# Patient Record
Sex: FEMALE | Race: WHITE | NOT HISPANIC OR LATINO
[De-identification: names, ages, dates, MRNs, and addresses within clinical notes are randomized per-mention and may not be internally consistent; named-entity substitution may affect disease eponyms.]

---

## 2017-07-06 ENCOUNTER — APPOINTMENT (OUTPATIENT)
Dept: OPHTHALMOLOGY | Facility: CLINIC | Age: 72
End: 2017-07-06

## 2017-07-06 DIAGNOSIS — B30.0 KERATOCONJUNCTIVITIS DUE TO ADENOVIRUS: ICD-10-CM

## 2017-08-04 NOTE — ASU PATIENT PROFILE, ADULT - MEDICATIONS TO HOLD
Do not take any diuretics,hypoglycemics, aspirin,anti-inflammatory products and vitamins prior to surgery

## 2017-08-04 NOTE — ASU PATIENT PROFILE, ADULT - MEDICATIONS TO TAKE
May Take all cardiac, blood pressure and parkinsons medications if applicable-with a sip of water only on the morning of surgery.

## 2017-08-04 NOTE — ASU PATIENT PROFILE, ADULT - PSH
Complete rotator cuff tear, left  surgery for, hardware placed  H/O: hammer toe correction  left foot,hardware placed  History of cholecystectomy    History of tonsillectomy    S/P tubal ligation

## 2017-08-07 ENCOUNTER — OUTPATIENT (OUTPATIENT)
Dept: OUTPATIENT SERVICES | Facility: HOSPITAL | Age: 72
LOS: 1 days | End: 2017-08-07
Payer: MEDICARE

## 2017-08-07 ENCOUNTER — TRANSCRIPTION ENCOUNTER (OUTPATIENT)
Age: 72
End: 2017-08-07

## 2017-08-07 VITALS
OXYGEN SATURATION: 100 % | HEART RATE: 60 BPM | DIASTOLIC BLOOD PRESSURE: 55 MMHG | SYSTOLIC BLOOD PRESSURE: 100 MMHG | RESPIRATION RATE: 16 BRPM

## 2017-08-07 VITALS
RESPIRATION RATE: 5 BRPM | WEIGHT: 141.1 LBS | HEART RATE: 57 BPM | TEMPERATURE: 98 F | OXYGEN SATURATION: 100 % | HEIGHT: 61 IN | SYSTOLIC BLOOD PRESSURE: 112 MMHG | DIASTOLIC BLOOD PRESSURE: 61 MMHG

## 2017-08-07 DIAGNOSIS — Z90.49 ACQUIRED ABSENCE OF OTHER SPECIFIED PARTS OF DIGESTIVE TRACT: Chronic | ICD-10-CM

## 2017-08-07 DIAGNOSIS — Z90.89 ACQUIRED ABSENCE OF OTHER ORGANS: Chronic | ICD-10-CM

## 2017-08-07 DIAGNOSIS — Z98.51 TUBAL LIGATION STATUS: Chronic | ICD-10-CM

## 2017-08-07 DIAGNOSIS — Z98.89 OTHER SPECIFIED POSTPROCEDURAL STATES: Chronic | ICD-10-CM

## 2017-08-07 DIAGNOSIS — M75.122 COMPLETE ROTATOR CUFF TEAR OR RUPTURE OF LEFT SHOULDER, NOT SPECIFIED AS TRAUMATIC: Chronic | ICD-10-CM

## 2017-08-07 DIAGNOSIS — H25.811 COMBINED FORMS OF AGE-RELATED CATARACT, RIGHT EYE: ICD-10-CM

## 2017-08-07 PROCEDURE — C1780: CPT

## 2017-08-07 PROCEDURE — 66984 XCAPSL CTRC RMVL W/O ECP: CPT | Mod: RT

## 2017-08-21 ENCOUNTER — OUTPATIENT (OUTPATIENT)
Dept: OUTPATIENT SERVICES | Facility: HOSPITAL | Age: 72
LOS: 1 days | End: 2017-08-21
Payer: MEDICARE

## 2017-08-21 ENCOUNTER — TRANSCRIPTION ENCOUNTER (OUTPATIENT)
Age: 72
End: 2017-08-21

## 2017-08-21 VITALS
TEMPERATURE: 98 F | HEART RATE: 56 BPM | SYSTOLIC BLOOD PRESSURE: 116 MMHG | HEIGHT: 61 IN | WEIGHT: 142.86 LBS | DIASTOLIC BLOOD PRESSURE: 61 MMHG | OXYGEN SATURATION: 100 % | RESPIRATION RATE: 20 BRPM

## 2017-08-21 VITALS
SYSTOLIC BLOOD PRESSURE: 102 MMHG | HEART RATE: 54 BPM | RESPIRATION RATE: 18 BRPM | DIASTOLIC BLOOD PRESSURE: 53 MMHG | OXYGEN SATURATION: 99 %

## 2017-08-21 DIAGNOSIS — Z90.89 ACQUIRED ABSENCE OF OTHER ORGANS: Chronic | ICD-10-CM

## 2017-08-21 DIAGNOSIS — Z98.49 CATARACT EXTRACTION STATUS, UNSPECIFIED EYE: Chronic | ICD-10-CM

## 2017-08-21 DIAGNOSIS — Z90.49 ACQUIRED ABSENCE OF OTHER SPECIFIED PARTS OF DIGESTIVE TRACT: Chronic | ICD-10-CM

## 2017-08-21 DIAGNOSIS — Z98.51 TUBAL LIGATION STATUS: Chronic | ICD-10-CM

## 2017-08-21 DIAGNOSIS — H25.812 COMBINED FORMS OF AGE-RELATED CATARACT, LEFT EYE: ICD-10-CM

## 2017-08-21 DIAGNOSIS — Z98.89 OTHER SPECIFIED POSTPROCEDURAL STATES: Chronic | ICD-10-CM

## 2017-08-21 DIAGNOSIS — M75.122 COMPLETE ROTATOR CUFF TEAR OR RUPTURE OF LEFT SHOULDER, NOT SPECIFIED AS TRAUMATIC: Chronic | ICD-10-CM

## 2017-08-21 PROCEDURE — C1780: CPT

## 2017-08-21 PROCEDURE — 66984 XCAPSL CTRC RMVL W/O ECP: CPT | Mod: LT

## 2017-08-21 NOTE — ASU PATIENT PROFILE, ADULT - PSH
Complete rotator cuff tear, left  surgery for, hardware placed  H/O: hammer toe correction  left foot,hardware placed  History of cholecystectomy    History of tonsillectomy    S/P tubal ligation    Status post cataract extraction  right eye

## 2018-01-22 ENCOUNTER — NON-APPOINTMENT (OUTPATIENT)
Age: 73
End: 2018-01-22

## 2018-01-22 ENCOUNTER — APPOINTMENT (OUTPATIENT)
Dept: CARDIOLOGY | Facility: CLINIC | Age: 73
End: 2018-01-22
Payer: MEDICARE

## 2018-01-22 VITALS
WEIGHT: 140 LBS | HEART RATE: 58 BPM | OXYGEN SATURATION: 100 % | DIASTOLIC BLOOD PRESSURE: 67 MMHG | HEIGHT: 62 IN | BODY MASS INDEX: 25.76 KG/M2 | SYSTOLIC BLOOD PRESSURE: 109 MMHG

## 2018-01-22 PROCEDURE — 93000 ELECTROCARDIOGRAM COMPLETE: CPT

## 2018-01-22 PROCEDURE — 99215 OFFICE O/P EST HI 40 MIN: CPT

## 2018-01-22 RX ORDER — GATIFLOXACIN 5 MG/ML
0.5 SOLUTION/ DROPS OPHTHALMIC
Qty: 25 | Refills: 0 | Status: DISCONTINUED | COMMUNITY
Start: 2017-06-16 | End: 2018-01-22

## 2018-01-22 RX ORDER — TOBRAMYCIN AND DEXAMETHASONE 3; 1 MG/ML; MG/ML
0.3-0.1 SUSPENSION/ DROPS OPHTHALMIC 4 TIMES DAILY
Qty: 1 | Refills: 1 | Status: DISCONTINUED | COMMUNITY
Start: 2017-07-06 | End: 2018-01-22

## 2018-01-22 RX ORDER — TOBRAMYCIN AND DEXAMETHASONE 3; 1 MG/ML; MG/ML
0.3-0.1 SUSPENSION/ DROPS OPHTHALMIC
Qty: 5 | Refills: 0 | Status: DISCONTINUED | COMMUNITY
Start: 2017-06-29 | End: 2018-01-22

## 2018-01-22 RX ORDER — PREDNISOLONE ACETATE 10 MG/ML
1 SUSPENSION/ DROPS OPHTHALMIC
Qty: 10 | Refills: 0 | Status: DISCONTINUED | COMMUNITY
Start: 2017-06-16 | End: 2018-01-22

## 2018-01-22 RX ORDER — OFLOXACIN 3 MG/ML
0.3 SOLUTION/ DROPS OPHTHALMIC
Qty: 5 | Refills: 0 | Status: DISCONTINUED | COMMUNITY
Start: 2017-07-03 | End: 2018-01-22

## 2018-01-22 RX ORDER — KETOROLAC TROMETHAMINE 4 MG/ML
0.4 SOLUTION/ DROPS OPHTHALMIC
Qty: 5 | Refills: 0 | Status: DISCONTINUED | COMMUNITY
Start: 2017-06-16 | End: 2018-01-22

## 2018-01-24 ENCOUNTER — APPOINTMENT (OUTPATIENT)
Dept: ORTHOPEDIC SURGERY | Facility: CLINIC | Age: 73
End: 2018-01-24
Payer: MEDICARE

## 2018-01-24 DIAGNOSIS — Z87.39 PERSONAL HISTORY OF OTHER DISEASES OF THE MUSCULOSKELETAL SYSTEM AND CONNECTIVE TISSUE: ICD-10-CM

## 2018-01-24 PROCEDURE — 99203 OFFICE O/P NEW LOW 30 MIN: CPT

## 2018-01-24 PROCEDURE — 73564 X-RAY EXAM KNEE 4 OR MORE: CPT | Mod: LT

## 2018-01-25 LAB
ANION GAP SERPL CALC-SCNC: 15 MMOL/L
BUN SERPL-MCNC: 21 MG/DL
CALCIUM SERPL-MCNC: 9.6 MG/DL
CHLORIDE SERPL-SCNC: 103 MMOL/L
CO2 SERPL-SCNC: 24 MMOL/L
CREAT SERPL-MCNC: 0.72 MG/DL
GLUCOSE SERPL-MCNC: 80 MG/DL
POTASSIUM SERPL-SCNC: 4.5 MMOL/L
SODIUM SERPL-SCNC: 142 MMOL/L

## 2018-01-26 PROBLEM — Z87.39 HISTORY OF OSTEOARTHRITIS: Status: RESOLVED | Noted: 2017-07-06 | Resolved: 2018-01-26

## 2018-01-30 ENCOUNTER — FORM ENCOUNTER (OUTPATIENT)
Age: 73
End: 2018-01-30

## 2018-01-31 ENCOUNTER — OUTPATIENT (OUTPATIENT)
Dept: OUTPATIENT SERVICES | Facility: HOSPITAL | Age: 73
LOS: 1 days | End: 2018-01-31
Payer: MEDICARE

## 2018-01-31 ENCOUNTER — APPOINTMENT (OUTPATIENT)
Dept: CARDIOLOGY | Facility: CLINIC | Age: 73
End: 2018-01-31

## 2018-01-31 DIAGNOSIS — R07.9 CHEST PAIN, UNSPECIFIED: ICD-10-CM

## 2018-01-31 DIAGNOSIS — I71.2 THORACIC AORTIC ANEURYSM, WITHOUT RUPTURE: ICD-10-CM

## 2018-01-31 DIAGNOSIS — Z90.89 ACQUIRED ABSENCE OF OTHER ORGANS: Chronic | ICD-10-CM

## 2018-01-31 DIAGNOSIS — Z98.49 CATARACT EXTRACTION STATUS, UNSPECIFIED EYE: Chronic | ICD-10-CM

## 2018-01-31 DIAGNOSIS — Z90.49 ACQUIRED ABSENCE OF OTHER SPECIFIED PARTS OF DIGESTIVE TRACT: Chronic | ICD-10-CM

## 2018-01-31 DIAGNOSIS — M75.122 COMPLETE ROTATOR CUFF TEAR OR RUPTURE OF LEFT SHOULDER, NOT SPECIFIED AS TRAUMATIC: Chronic | ICD-10-CM

## 2018-01-31 DIAGNOSIS — Z98.51 TUBAL LIGATION STATUS: Chronic | ICD-10-CM

## 2018-01-31 DIAGNOSIS — Z98.89 OTHER SPECIFIED POSTPROCEDURAL STATES: Chronic | ICD-10-CM

## 2018-01-31 PROCEDURE — 71275 CT ANGIOGRAPHY CHEST: CPT | Mod: 26

## 2018-01-31 PROCEDURE — 71275 CT ANGIOGRAPHY CHEST: CPT

## 2018-02-01 ENCOUNTER — OTHER (OUTPATIENT)
Age: 73
End: 2018-02-01

## 2018-02-06 ENCOUNTER — OUTPATIENT (OUTPATIENT)
Dept: OUTPATIENT SERVICES | Facility: HOSPITAL | Age: 73
LOS: 1 days | End: 2018-02-06
Payer: MEDICARE

## 2018-02-06 VITALS
TEMPERATURE: 98 F | WEIGHT: 141.1 LBS | RESPIRATION RATE: 14 BRPM | DIASTOLIC BLOOD PRESSURE: 60 MMHG | SYSTOLIC BLOOD PRESSURE: 107 MMHG | HEIGHT: 61 IN | HEART RATE: 60 BPM

## 2018-02-06 DIAGNOSIS — Z98.49 CATARACT EXTRACTION STATUS, UNSPECIFIED EYE: Chronic | ICD-10-CM

## 2018-02-06 DIAGNOSIS — Z98.89 OTHER SPECIFIED POSTPROCEDURAL STATES: Chronic | ICD-10-CM

## 2018-02-06 DIAGNOSIS — M19.90 UNSPECIFIED OSTEOARTHRITIS, UNSPECIFIED SITE: ICD-10-CM

## 2018-02-06 DIAGNOSIS — Z98.51 TUBAL LIGATION STATUS: Chronic | ICD-10-CM

## 2018-02-06 DIAGNOSIS — M19.079 PRIMARY OSTEOARTHRITIS, UNSPECIFIED ANKLE AND FOOT: Chronic | ICD-10-CM

## 2018-02-06 DIAGNOSIS — Z90.49 ACQUIRED ABSENCE OF OTHER SPECIFIED PARTS OF DIGESTIVE TRACT: Chronic | ICD-10-CM

## 2018-02-06 DIAGNOSIS — Z90.89 ACQUIRED ABSENCE OF OTHER ORGANS: Chronic | ICD-10-CM

## 2018-02-06 DIAGNOSIS — Z01.818 ENCOUNTER FOR OTHER PREPROCEDURAL EXAMINATION: ICD-10-CM

## 2018-02-06 DIAGNOSIS — M17.11 UNILATERAL PRIMARY OSTEOARTHRITIS, RIGHT KNEE: ICD-10-CM

## 2018-02-06 DIAGNOSIS — M75.122 COMPLETE ROTATOR CUFF TEAR OR RUPTURE OF LEFT SHOULDER, NOT SPECIFIED AS TRAUMATIC: Chronic | ICD-10-CM

## 2018-02-06 LAB
ALBUMIN SERPL ELPH-MCNC: 3.7 G/DL — SIGNIFICANT CHANGE UP (ref 3.3–5)
ALP SERPL-CCNC: 106 U/L — SIGNIFICANT CHANGE UP (ref 30–120)
ALT FLD-CCNC: 39 U/L DA — SIGNIFICANT CHANGE UP (ref 10–60)
ANION GAP SERPL CALC-SCNC: 11 MMOL/L — SIGNIFICANT CHANGE UP (ref 5–17)
APTT BLD: 37.5 SEC — HIGH (ref 27.5–37.4)
AST SERPL-CCNC: 25 U/L — SIGNIFICANT CHANGE UP (ref 10–40)
BILIRUB SERPL-MCNC: 0.3 MG/DL — SIGNIFICANT CHANGE UP (ref 0.2–1.2)
BLD GP AB SCN SERPL QL: SIGNIFICANT CHANGE UP
BUN SERPL-MCNC: 23 MG/DL — SIGNIFICANT CHANGE UP (ref 7–23)
CALCIUM SERPL-MCNC: 9.1 MG/DL — SIGNIFICANT CHANGE UP (ref 8.4–10.5)
CHLORIDE SERPL-SCNC: 105 MMOL/L — SIGNIFICANT CHANGE UP (ref 96–108)
CO2 SERPL-SCNC: 26 MMOL/L — SIGNIFICANT CHANGE UP (ref 22–31)
CREAT SERPL-MCNC: 0.65 MG/DL — SIGNIFICANT CHANGE UP (ref 0.5–1.3)
GLUCOSE SERPL-MCNC: 97 MG/DL — SIGNIFICANT CHANGE UP (ref 70–99)
HCT VFR BLD CALC: 38.2 % — SIGNIFICANT CHANGE UP (ref 34.5–45)
HGB BLD-MCNC: 12.9 G/DL — SIGNIFICANT CHANGE UP (ref 11.5–15.5)
INR BLD: 1.01 RATIO — SIGNIFICANT CHANGE UP (ref 0.88–1.16)
MCHC RBC-ENTMCNC: 30.7 PG — SIGNIFICANT CHANGE UP (ref 27–34)
MCHC RBC-ENTMCNC: 33.7 GM/DL — SIGNIFICANT CHANGE UP (ref 32–36)
MCV RBC AUTO: 91.2 FL — SIGNIFICANT CHANGE UP (ref 80–100)
MRSA PCR RESULT.: SIGNIFICANT CHANGE UP
PLATELET # BLD AUTO: 239 K/UL — SIGNIFICANT CHANGE UP (ref 150–400)
POTASSIUM SERPL-MCNC: 4.2 MMOL/L — SIGNIFICANT CHANGE UP (ref 3.5–5.3)
POTASSIUM SERPL-SCNC: 4.2 MMOL/L — SIGNIFICANT CHANGE UP (ref 3.5–5.3)
PROT SERPL-MCNC: 7 G/DL — SIGNIFICANT CHANGE UP (ref 6–8.3)
PROTHROM AB SERPL-ACNC: 11 SEC — SIGNIFICANT CHANGE UP (ref 9.8–12.7)
RBC # BLD: 4.19 M/UL — SIGNIFICANT CHANGE UP (ref 3.8–5.2)
RBC # FLD: 12 % — SIGNIFICANT CHANGE UP (ref 10.3–14.5)
S AUREUS DNA NOSE QL NAA+PROBE: SIGNIFICANT CHANGE UP
SODIUM SERPL-SCNC: 142 MMOL/L — SIGNIFICANT CHANGE UP (ref 135–145)
WBC # BLD: 4.7 K/UL — SIGNIFICANT CHANGE UP (ref 3.8–10.5)
WBC # FLD AUTO: 4.7 K/UL — SIGNIFICANT CHANGE UP (ref 3.8–10.5)

## 2018-02-06 PROCEDURE — 93010 ELECTROCARDIOGRAM REPORT: CPT | Mod: NC

## 2018-02-06 PROCEDURE — G0463: CPT

## 2018-02-06 PROCEDURE — 93005 ELECTROCARDIOGRAM TRACING: CPT

## 2018-02-06 NOTE — H&P PST ADULT - CARDIOVASCULAR COMMENTS
Thoracic aneurysm being monitored by cardiologist , CT chest done last month; showed 4 cm . cardiac eval done last year , cleared by cardiologist

## 2018-02-06 NOTE — H&P PST ADULT - PSH
Complete rotator cuff tear, left  surgery for, hardware placed  H/O: hammer toe correction  left foot,hardware placed  History of cholecystectomy    History of tonsillectomy    S/P tubal ligation    Status post cataract extraction  right eye Complete rotator cuff tear, left  surgery for, hardware placed  H/O: hammer toe correction  left foot,hardware placed  History of cholecystectomy    History of tonsillectomy    S/P tubal ligation    Status post cataract extraction  bilateral Complete rotator cuff tear, left  surgery for, hardware placed  H/O: hammer toe correction  left foot, hardware placed  History of cholecystectomy    History of tonsillectomy    S/P tubal ligation    Status post cataract extraction  bilateral Arthritis of big toe  left big toe fusion 2012  Complete rotator cuff tear, left  surgery for, hardware placed  H/O: hammer toe correction  left foot,hardware placed, bunionectomy  History of cholecystectomy    History of tonsillectomy    S/P tubal ligation    Status post cataract extraction  bilateral

## 2018-02-06 NOTE — H&P PST ADULT - HISTORY OF PRESENT ILLNESS
This is a 71 y/o female who presents with long standing history of worsening left knee pain , difficulty walking . pain is constant and worse pain 10/10 with walking and standing , . pain is relieved by standing . Takes Tylenol PRN for pain with mild relief. Scheduled for left knee replacement on 2/15/18

## 2018-02-06 NOTE — H&P PST ADULT - PMH
Arthritis    Osteoporosis    Other specified hypothyroidism Aneurysm of thoracic aorta    Arthritis    Osteoporosis    Other specified hypothyroidism    Rheumatic fever  at age 10  Sinus bradycardia Aneurysm of thoracic aorta  CT scan 1/2018 showed 4.2 cm, being monitored by cardiologist  Arthritis    Osteoporosis    Other specified hypothyroidism    Rheumatic fever  at age 10  Sinus bradycardia

## 2018-02-06 NOTE — H&P PST ADULT - FAMILY HISTORY
Father  Still living? No  Family history of pancreatic cancer, Age at diagnosis: Age Unknown     Mother  Still living? No  Family history of cancer in mother, Age at diagnosis: Age Unknown

## 2018-02-06 NOTE — H&P PST ADULT - NSANTHOSAYNRD_GEN_A_CORE
No. YARELY screening performed.  STOP BANG Legend: 0-2 = LOW Risk; 3-4 = INTERMEDIATE Risk; 5-8 = HIGH Risk

## 2018-02-06 NOTE — H&P PST ADULT - PROBLEM SELECTOR PLAN 1
Left knee replacement   Medical clearance  cardiac clearance  pre op instructions Left knee replacement on 2/15/18   Medical clearance  cardiac clearance  pre op instructions

## 2018-02-07 RX ORDER — APREPITANT 80 MG/1
40 CAPSULE ORAL ONCE
Qty: 0 | Refills: 0 | Status: COMPLETED | OUTPATIENT
Start: 2018-02-15 | End: 2018-02-15

## 2018-02-13 RX ORDER — POLYETHYLENE GLYCOL 3350 17 G/17G
17 POWDER, FOR SOLUTION ORAL DAILY
Qty: 0 | Refills: 0 | Status: DISCONTINUED | OUTPATIENT
Start: 2018-02-15 | End: 2018-02-17

## 2018-02-13 RX ORDER — DOCUSATE SODIUM 100 MG
100 CAPSULE ORAL THREE TIMES A DAY
Qty: 0 | Refills: 0 | Status: DISCONTINUED | OUTPATIENT
Start: 2018-02-15 | End: 2018-02-17

## 2018-02-13 RX ORDER — SENNA PLUS 8.6 MG/1
2 TABLET ORAL AT BEDTIME
Qty: 0 | Refills: 0 | Status: DISCONTINUED | OUTPATIENT
Start: 2018-02-15 | End: 2018-02-17

## 2018-02-13 RX ORDER — SODIUM CHLORIDE 9 MG/ML
1000 INJECTION, SOLUTION INTRAVENOUS
Qty: 0 | Refills: 0 | Status: DISCONTINUED | OUTPATIENT
Start: 2018-02-15 | End: 2018-02-17

## 2018-02-13 RX ORDER — MAGNESIUM HYDROXIDE 400 MG/1
30 TABLET, CHEWABLE ORAL DAILY
Qty: 0 | Refills: 0 | Status: DISCONTINUED | OUTPATIENT
Start: 2018-02-15 | End: 2018-02-17

## 2018-02-13 RX ORDER — ONDANSETRON 8 MG/1
4 TABLET, FILM COATED ORAL EVERY 6 HOURS
Qty: 0 | Refills: 0 | Status: DISCONTINUED | OUTPATIENT
Start: 2018-02-15 | End: 2018-02-17

## 2018-02-13 RX ORDER — PANTOPRAZOLE SODIUM 20 MG/1
40 TABLET, DELAYED RELEASE ORAL DAILY
Qty: 0 | Refills: 0 | Status: DISCONTINUED | OUTPATIENT
Start: 2018-02-15 | End: 2018-02-17

## 2018-02-13 RX ORDER — CHLORHEXIDINE GLUCONATE 213 G/1000ML
1 SOLUTION TOPICAL ONCE
Qty: 0 | Refills: 0 | Status: COMPLETED | OUTPATIENT
Start: 2018-02-15 | End: 2018-02-15

## 2018-02-14 NOTE — PATIENT PROFILE ADULT. - PMH
Aneurysm of thoracic aorta  CT scan 1/2018 showed 4.2 cm, being monitored by cardiologist  Arthritis    Osteoporosis    Other specified hypothyroidism    Rheumatic fever  at age 10  Sinus bradycardia

## 2018-02-14 NOTE — PATIENT PROFILE ADULT. - PSH
Arthritis of big toe  left big toe fusion 2012  Complete rotator cuff tear, left  surgery for, hardware placed  H/O: hammer toe correction  left foot,hardware placed, bunionectomy  History of cholecystectomy    History of tonsillectomy    S/P tubal ligation    Status post cataract extraction  bilateral

## 2018-02-15 ENCOUNTER — APPOINTMENT (OUTPATIENT)
Dept: ORTHOPEDIC SURGERY | Facility: HOSPITAL | Age: 73
End: 2018-02-15

## 2018-02-15 ENCOUNTER — INPATIENT (INPATIENT)
Facility: HOSPITAL | Age: 73
LOS: 1 days | Discharge: NURSING, REHAB OR LONG-TERM CARE | DRG: 470 | End: 2018-02-17
Attending: ORTHOPAEDIC SURGERY | Admitting: ORTHOPAEDIC SURGERY
Payer: MEDICARE

## 2018-02-15 ENCOUNTER — TRANSCRIPTION ENCOUNTER (OUTPATIENT)
Age: 73
End: 2018-02-15

## 2018-02-15 ENCOUNTER — RESULT REVIEW (OUTPATIENT)
Age: 73
End: 2018-02-15

## 2018-02-15 VITALS
HEIGHT: 61 IN | RESPIRATION RATE: 15 BRPM | DIASTOLIC BLOOD PRESSURE: 60 MMHG | OXYGEN SATURATION: 98 % | WEIGHT: 137.79 LBS | HEART RATE: 63 BPM | SYSTOLIC BLOOD PRESSURE: 118 MMHG | TEMPERATURE: 98 F

## 2018-02-15 DIAGNOSIS — I71.2 THORACIC AORTIC ANEURYSM, WITHOUT RUPTURE: ICD-10-CM

## 2018-02-15 DIAGNOSIS — M17.11 UNILATERAL PRIMARY OSTEOARTHRITIS, RIGHT KNEE: ICD-10-CM

## 2018-02-15 DIAGNOSIS — M75.122 COMPLETE ROTATOR CUFF TEAR OR RUPTURE OF LEFT SHOULDER, NOT SPECIFIED AS TRAUMATIC: Chronic | ICD-10-CM

## 2018-02-15 DIAGNOSIS — Z01.818 ENCOUNTER FOR OTHER PREPROCEDURAL EXAMINATION: ICD-10-CM

## 2018-02-15 DIAGNOSIS — Z98.49 CATARACT EXTRACTION STATUS, UNSPECIFIED EYE: Chronic | ICD-10-CM

## 2018-02-15 DIAGNOSIS — M19.079 PRIMARY OSTEOARTHRITIS, UNSPECIFIED ANKLE AND FOOT: Chronic | ICD-10-CM

## 2018-02-15 DIAGNOSIS — Z98.89 OTHER SPECIFIED POSTPROCEDURAL STATES: Chronic | ICD-10-CM

## 2018-02-15 DIAGNOSIS — Z98.51 TUBAL LIGATION STATUS: Chronic | ICD-10-CM

## 2018-02-15 DIAGNOSIS — Z90.89 ACQUIRED ABSENCE OF OTHER ORGANS: Chronic | ICD-10-CM

## 2018-02-15 DIAGNOSIS — M25.562 PAIN IN LEFT KNEE: ICD-10-CM

## 2018-02-15 DIAGNOSIS — Z90.49 ACQUIRED ABSENCE OF OTHER SPECIFIED PARTS OF DIGESTIVE TRACT: Chronic | ICD-10-CM

## 2018-02-15 DIAGNOSIS — R00.1 BRADYCARDIA, UNSPECIFIED: ICD-10-CM

## 2018-02-15 LAB
ANION GAP SERPL CALC-SCNC: 9 MMOL/L — SIGNIFICANT CHANGE UP (ref 5–17)
CHLORIDE SERPL-SCNC: 107 MMOL/L — SIGNIFICANT CHANGE UP (ref 96–108)
CO2 SERPL-SCNC: 28 MMOL/L — SIGNIFICANT CHANGE UP (ref 22–31)
HCT VFR BLD CALC: 36.9 % — SIGNIFICANT CHANGE UP (ref 34.5–45)
HGB BLD-MCNC: 11.9 G/DL — SIGNIFICANT CHANGE UP (ref 11.5–15.5)
POTASSIUM SERPL-MCNC: 4.2 MMOL/L — SIGNIFICANT CHANGE UP (ref 3.5–5.3)
POTASSIUM SERPL-SCNC: 4.2 MMOL/L — SIGNIFICANT CHANGE UP (ref 3.5–5.3)
SODIUM SERPL-SCNC: 144 MMOL/L — SIGNIFICANT CHANGE UP (ref 135–145)

## 2018-02-15 PROCEDURE — 73560 X-RAY EXAM OF KNEE 1 OR 2: CPT | Mod: 26,LT

## 2018-02-15 PROCEDURE — 88305 TISSUE EXAM BY PATHOLOGIST: CPT | Mod: 26

## 2018-02-15 PROCEDURE — 88311 DECALCIFY TISSUE: CPT | Mod: 26

## 2018-02-15 PROCEDURE — 27447 TOTAL KNEE ARTHROPLASTY: CPT | Mod: LT

## 2018-02-15 PROCEDURE — 99222 1ST HOSP IP/OBS MODERATE 55: CPT

## 2018-02-15 PROCEDURE — 73562 X-RAY EXAM OF KNEE 3: CPT | Mod: 26,LT

## 2018-02-15 RX ORDER — OXYCODONE HYDROCHLORIDE 5 MG/1
5 TABLET ORAL
Qty: 0 | Refills: 0 | Status: DISCONTINUED | OUTPATIENT
Start: 2018-02-15 | End: 2018-02-17

## 2018-02-15 RX ORDER — THYROID 120 MG
1 TABLET ORAL
Qty: 0 | Refills: 0 | COMMUNITY

## 2018-02-15 RX ORDER — OXYCODONE HYDROCHLORIDE 5 MG/1
10 TABLET ORAL
Qty: 0 | Refills: 0 | Status: DISCONTINUED | OUTPATIENT
Start: 2018-02-15 | End: 2018-02-17

## 2018-02-15 RX ORDER — FAMOTIDINE 10 MG/ML
0 INJECTION INTRAVENOUS
Qty: 0 | Refills: 0 | COMMUNITY

## 2018-02-15 RX ORDER — ACETAMINOPHEN 500 MG
1000 TABLET ORAL EVERY 8 HOURS
Qty: 0 | Refills: 0 | Status: DISCONTINUED | OUTPATIENT
Start: 2018-02-16 | End: 2018-02-17

## 2018-02-15 RX ORDER — MORPHINE SULFATE 50 MG/1
2 CAPSULE, EXTENDED RELEASE ORAL
Qty: 0 | Refills: 0 | Status: DISCONTINUED | OUTPATIENT
Start: 2018-02-15 | End: 2018-02-15

## 2018-02-15 RX ORDER — ONDANSETRON 8 MG/1
4 TABLET, FILM COATED ORAL ONCE
Qty: 0 | Refills: 0 | Status: DISCONTINUED | OUTPATIENT
Start: 2018-02-15 | End: 2018-02-15

## 2018-02-15 RX ORDER — TRANEXAMIC ACID 100 MG/ML
1000 INJECTION, SOLUTION INTRAVENOUS ONCE
Qty: 0 | Refills: 0 | Status: COMPLETED | OUTPATIENT
Start: 2018-02-15 | End: 2018-02-15

## 2018-02-15 RX ORDER — KETOROLAC TROMETHAMINE 30 MG/ML
30 SYRINGE (ML) INJECTION ONCE
Qty: 0 | Refills: 0 | Status: DISCONTINUED | OUTPATIENT
Start: 2018-02-15 | End: 2018-02-15

## 2018-02-15 RX ORDER — INFLUENZA VIRUS VACCINE 15; 15; 15; 15 UG/.5ML; UG/.5ML; UG/.5ML; UG/.5ML
0.5 SUSPENSION INTRAMUSCULAR ONCE
Qty: 0 | Refills: 0 | Status: COMPLETED | OUTPATIENT
Start: 2018-02-15 | End: 2018-02-15

## 2018-02-15 RX ORDER — ACETAMINOPHEN 500 MG
1000 TABLET ORAL EVERY 6 HOURS
Qty: 0 | Refills: 0 | Status: COMPLETED | OUTPATIENT
Start: 2018-02-15 | End: 2018-02-16

## 2018-02-15 RX ORDER — HYDROMORPHONE HYDROCHLORIDE 2 MG/ML
0.5 INJECTION INTRAMUSCULAR; INTRAVENOUS; SUBCUTANEOUS
Qty: 0 | Refills: 0 | Status: DISCONTINUED | OUTPATIENT
Start: 2018-02-15 | End: 2018-02-17

## 2018-02-15 RX ORDER — CELECOXIB 200 MG/1
200 CAPSULE ORAL
Qty: 0 | Refills: 0 | Status: DISCONTINUED | OUTPATIENT
Start: 2018-02-16 | End: 2018-02-17

## 2018-02-15 RX ORDER — ACETAMINOPHEN 500 MG
1000 TABLET ORAL ONCE
Qty: 0 | Refills: 0 | Status: COMPLETED | OUTPATIENT
Start: 2018-02-15 | End: 2018-02-15

## 2018-02-15 RX ORDER — CEFAZOLIN SODIUM 1 G
2000 VIAL (EA) INJECTION ONCE
Qty: 0 | Refills: 0 | Status: COMPLETED | OUTPATIENT
Start: 2018-02-15 | End: 2018-02-15

## 2018-02-15 RX ORDER — THYROID 120 MG
90 TABLET ORAL DAILY
Qty: 0 | Refills: 0 | Status: DISCONTINUED | OUTPATIENT
Start: 2018-02-15 | End: 2018-02-17

## 2018-02-15 RX ORDER — ASPIRIN/CALCIUM CARB/MAGNESIUM 324 MG
162 TABLET ORAL EVERY 12 HOURS
Qty: 0 | Refills: 0 | Status: DISCONTINUED | OUTPATIENT
Start: 2018-02-16 | End: 2018-02-17

## 2018-02-15 RX ORDER — CEFAZOLIN SODIUM 1 G
2000 VIAL (EA) INJECTION EVERY 8 HOURS
Qty: 0 | Refills: 0 | Status: COMPLETED | OUTPATIENT
Start: 2018-02-15 | End: 2018-02-16

## 2018-02-15 RX ORDER — SODIUM CHLORIDE 9 MG/ML
1000 INJECTION, SOLUTION INTRAVENOUS
Qty: 0 | Refills: 0 | Status: DISCONTINUED | OUTPATIENT
Start: 2018-02-15 | End: 2018-02-15

## 2018-02-15 RX ADMIN — MORPHINE SULFATE 2 MILLIGRAM(S): 50 CAPSULE, EXTENDED RELEASE ORAL at 18:15

## 2018-02-15 RX ADMIN — CHLORHEXIDINE GLUCONATE 1 APPLICATION(S): 213 SOLUTION TOPICAL at 13:14

## 2018-02-15 RX ADMIN — Medication 30 MILLIGRAM(S): at 19:14

## 2018-02-15 RX ADMIN — SENNA PLUS 2 TABLET(S): 8.6 TABLET ORAL at 21:43

## 2018-02-15 RX ADMIN — MORPHINE SULFATE 2 MILLIGRAM(S): 50 CAPSULE, EXTENDED RELEASE ORAL at 18:22

## 2018-02-15 RX ADMIN — MORPHINE SULFATE 2 MILLIGRAM(S): 50 CAPSULE, EXTENDED RELEASE ORAL at 18:10

## 2018-02-15 RX ADMIN — Medication 400 MILLIGRAM(S): at 20:44

## 2018-02-15 RX ADMIN — MORPHINE SULFATE 2 MILLIGRAM(S): 50 CAPSULE, EXTENDED RELEASE ORAL at 17:58

## 2018-02-15 RX ADMIN — SODIUM CHLORIDE 100 MILLILITER(S): 9 INJECTION, SOLUTION INTRAVENOUS at 17:56

## 2018-02-15 RX ADMIN — Medication 100 MILLIGRAM(S): at 21:43

## 2018-02-15 RX ADMIN — APREPITANT 40 MILLIGRAM(S): 80 CAPSULE ORAL at 13:15

## 2018-02-15 RX ADMIN — MORPHINE SULFATE 2 MILLIGRAM(S): 50 CAPSULE, EXTENDED RELEASE ORAL at 18:29

## 2018-02-15 RX ADMIN — MORPHINE SULFATE 2 MILLIGRAM(S): 50 CAPSULE, EXTENDED RELEASE ORAL at 18:45

## 2018-02-15 RX ADMIN — Medication 30 MILLIGRAM(S): at 19:44

## 2018-02-15 RX ADMIN — Medication 100 MILLIGRAM(S): at 21:44

## 2018-02-15 RX ADMIN — Medication 1000 MILLIGRAM(S): at 21:00

## 2018-02-15 NOTE — CONSULT NOTE ADULT - ASSESSMENT
72F with hx of sinus bradycardia, hypothyroidism, and aneurysm of thoracic aorta who presents electively for left knee surgery.

## 2018-02-15 NOTE — BRIEF OPERATIVE NOTE - PROCEDURE
<<-----Click on this checkbox to enter Procedure Left total knee replacement  02/15/2018    Active  GELY

## 2018-02-15 NOTE — CONSULT NOTE ADULT - SUBJECTIVE AND OBJECTIVE BOX
72F with hx of sinus bradycardia, hypothyroidism, and aneurysm of thoracic aorta who presents electively for left knee surgery.  Long standing history of knee pain that is worse with ambulation.  Tried conservative measures with minimal relief.      Patient was seen in the PACU recovering.  Patient had some leg pain post-operatively and was given toradol in addition to her ordered morphine.  Patient denies any nausea/vomiting and was able to void.  Patient also worked with physical therapy.  Patient admits to having nearly all of her sensation back.      PAST MEDICAL & SURGICAL HISTORY:  Sinus bradycardia  Aneurysm of thoracic aorta: CT scan 1/2018 showed 4.2 cm, being monitored by cardiologist  Rheumatic fever: at age 10  Arthritis  Osteoporosis  Other specified hypothyroidism  Arthritis of big toe: left big toe fusion 2012  Status post cataract extraction: bilateral  History of tonsillectomy  History of cholecystectomy  H/O: hammer toe correction: left foot,hardware placed, bunionectomy  Complete rotator cuff tear, left: surgery for, hardware placed  S/P tubal ligation      REVIEW OF SYSTEMS:  CONSTITUTIONAL:    no fever or weight loss or fatigue  EYES:    no eye pain or visual disturbances or discharge  ENMT:     no difficulty hearing or tinnitus or vertigo, no sinus or throat pain  NECK:    no pain or stiffness  BREASTS:    no pain or masses or nipple discharge  RESPIRATORY:    no cough or wheezing or chills or hemoptysis, no shortness of breath  CARDIOVASCULAR:    no chest pain or palpitations or dizziness or leg swelling  GASTROINTESTINAL:    no abdominal or epigastric pain. no nausea or vomiting or hematemesis, no diarrhea or constipation. no melena or hematochezia.  GENITOURINARY:    no dysuria or frequency or hematuria or incontinence  NEUROLOGICAL:    no headaches or memory loss or loss of strength or numbness or tremors  SKIN:    no itching or burning or rashes or lesions   LYMPH NODES:    no enlarged glands  ENDOCRINE:    no heat or cold intolerance, no hair loss, no polydipsia or polyuria  MUSCULOSKELETAL:    +left leg pain  PSYCHIATRIC:    no depression or anxiety or mood swings or difficulty sleeping  HEME/LYMPH:    no easy bruising or bleeding gums  ALLERGY AND IMMUNOLOGIC:    no hives or eczema      HOME MEDICATIONS:    · 	acetaminophen 325 mg oral tablet: Last Dose Taken:  , 2 tab(s) orally every 6 hours, As needed, Mild Pain  · 	NP Thyroid 90 mg oral tablet: Last Dose Taken:  , 1 tab(s) orally once a day    ALLERGIES and INTOLERANCES:  No Known Allergies    SOCIAL HISTORY:  +coffee drinker  - denies any etoh, illegal drug use, or smoking habits    FAMILY HISTORY:  Family history of cancer in mother (Mother): tumor in the nose  Family history of pancreatic cancer (Father)      PHYSICAL EXAM:  Vital Signs Last 24 Hrs  T(C): 36.6 (15 Feb 2018 17:37), Max: 36.7 (15 Feb 2018 12:48)  T(F): 97.8 (15 Feb 2018 17:37), Max: 98 (15 Feb 2018 12:48)  HR: 74 (15 Feb 2018 20:00) (63 - 95)  BP: 109/51 (15 Feb 2018 20:00) (91/45 - 133/53)  BP(mean): --  RR: 16 (15 Feb 2018 20:00) (14 - 17)  SpO2: 96% (15 Feb 2018 19:45) (96% - 100%)    GENERAL:     NAD, well-groomed, well-developed  HEAD:     atraumatic, normocephalic  EYES:     EOMI, conjunctiva and sclera clear  ENMT:     no tonsillar erythema or exudates or enlargement, no oral lesions, moist mucous membranes, good dentition  NECK:     supple, no JVD  RESPIRATORY:     clear to auscultation bilaterally, no rales or rhonchi or wheezing or rubs  CARDIOVASCULAR:     regular rate and rhythm, no murmurs or rubs or gallops, 2+ peripheral pulses  GASTROINTESTINAL:     soft, nontender, nondistended, no hepatosplenomegaly palpated, bowel sounds present  EXTREMITIES:     no clubbing or cyanosis or edema  MUSCULOSKELETAL:     no joint pain or swelling or deformities  NERVOUS SYSTEM:     motor strength intact with 5/5 B/L upper and lower extremities, no gross sensory deficits  SKIN:     no rashes or lesions  PSYCH:     appropriate, alert and orientated x3, good concentration      LABS:                        11.9   x     )-----------( x        ( 15 Feb 2018 20:00 )             36.9                         12.9   4.7   )-----------( 239      ( 06 Feb 2018 12:13 )             38.2     15 Feb 2018 20:00    144    |  107    |  x      ----------------------------<  x      4.2     |  28     |  x          06 Feb 2018 12:13    142    |  105    |  23     ----------------------------<  97     4.2     |  26     |  0.65         Ca    9.1        06 Feb 2018 12:13    TPro  7.0    /  Alb  3.7    /  TBili  0.3    /  DBili  x      /  AST  25     /  ALT  39     /  AlkPhos  106    06 Feb 2018 12:13    LIVER FUNCTIONS - ( 06 Feb 2018 12:13 )  Alb: 3.7 g/dL / Pro: 7.0 g/dL / ALK PHOS: 106 U/L / ALT: 39 U/L DA / AST: 25 U/L / GGT: x           PT/INR - ( 06 Feb 2018 12:13 )   PT: 11.0 ;   INR: 1.01          PTT - ( 06 Feb 2018 12:13 )  PTT:37.5<H>      EKG - sinus bradycardia

## 2018-02-16 ENCOUNTER — TRANSCRIPTION ENCOUNTER (OUTPATIENT)
Age: 73
End: 2018-02-16

## 2018-02-16 LAB
ANION GAP SERPL CALC-SCNC: 9 MMOL/L — SIGNIFICANT CHANGE UP (ref 5–17)
BUN SERPL-MCNC: 14 MG/DL — SIGNIFICANT CHANGE UP (ref 7–23)
CALCIUM SERPL-MCNC: 8.6 MG/DL — SIGNIFICANT CHANGE UP (ref 8.4–10.5)
CHLORIDE SERPL-SCNC: 103 MMOL/L — SIGNIFICANT CHANGE UP (ref 96–108)
CO2 SERPL-SCNC: 29 MMOL/L — SIGNIFICANT CHANGE UP (ref 22–31)
CREAT SERPL-MCNC: 0.72 MG/DL — SIGNIFICANT CHANGE UP (ref 0.5–1.3)
GLUCOSE SERPL-MCNC: 99 MG/DL — SIGNIFICANT CHANGE UP (ref 70–99)
HCT VFR BLD CALC: 34.8 % — SIGNIFICANT CHANGE UP (ref 34.5–45)
HGB BLD-MCNC: 11.4 G/DL — LOW (ref 11.5–15.5)
MCHC RBC-ENTMCNC: 30 PG — SIGNIFICANT CHANGE UP (ref 27–34)
MCHC RBC-ENTMCNC: 32.7 GM/DL — SIGNIFICANT CHANGE UP (ref 32–36)
MCV RBC AUTO: 91.7 FL — SIGNIFICANT CHANGE UP (ref 80–100)
PLATELET # BLD AUTO: 182 K/UL — SIGNIFICANT CHANGE UP (ref 150–400)
POTASSIUM SERPL-MCNC: 3.8 MMOL/L — SIGNIFICANT CHANGE UP (ref 3.5–5.3)
POTASSIUM SERPL-SCNC: 3.8 MMOL/L — SIGNIFICANT CHANGE UP (ref 3.5–5.3)
RBC # BLD: 3.8 M/UL — SIGNIFICANT CHANGE UP (ref 3.8–5.2)
RBC # FLD: 12 % — SIGNIFICANT CHANGE UP (ref 10.3–14.5)
SODIUM SERPL-SCNC: 141 MMOL/L — SIGNIFICANT CHANGE UP (ref 135–145)
WBC # BLD: 6.3 K/UL — SIGNIFICANT CHANGE UP (ref 3.8–10.5)
WBC # FLD AUTO: 6.3 K/UL — SIGNIFICANT CHANGE UP (ref 3.8–10.5)

## 2018-02-16 PROCEDURE — 99233 SBSQ HOSP IP/OBS HIGH 50: CPT

## 2018-02-16 RX ORDER — SODIUM CHLORIDE 9 MG/ML
500 INJECTION, SOLUTION INTRAVENOUS ONCE
Qty: 0 | Refills: 0 | Status: COMPLETED | OUTPATIENT
Start: 2018-02-16 | End: 2018-02-16

## 2018-02-16 RX ORDER — ASPIRIN/CALCIUM CARB/MAGNESIUM 324 MG
2 TABLET ORAL
Qty: 0 | Refills: 0 | COMMUNITY
Start: 2018-02-16 | End: 2018-03-30

## 2018-02-16 RX ADMIN — OXYCODONE HYDROCHLORIDE 10 MILLIGRAM(S): 5 TABLET ORAL at 09:50

## 2018-02-16 RX ADMIN — Medication 100 MILLIGRAM(S): at 21:13

## 2018-02-16 RX ADMIN — Medication 1000 MILLIGRAM(S): at 09:17

## 2018-02-16 RX ADMIN — SODIUM CHLORIDE 250 MILLILITER(S): 9 INJECTION, SOLUTION INTRAVENOUS at 11:30

## 2018-02-16 RX ADMIN — OXYCODONE HYDROCHLORIDE 10 MILLIGRAM(S): 5 TABLET ORAL at 12:51

## 2018-02-16 RX ADMIN — OXYCODONE HYDROCHLORIDE 5 MILLIGRAM(S): 5 TABLET ORAL at 18:12

## 2018-02-16 RX ADMIN — CELECOXIB 200 MILLIGRAM(S): 200 CAPSULE ORAL at 18:13

## 2018-02-16 RX ADMIN — CELECOXIB 200 MILLIGRAM(S): 200 CAPSULE ORAL at 09:13

## 2018-02-16 RX ADMIN — SENNA PLUS 2 TABLET(S): 8.6 TABLET ORAL at 21:13

## 2018-02-16 RX ADMIN — OXYCODONE HYDROCHLORIDE 10 MILLIGRAM(S): 5 TABLET ORAL at 10:20

## 2018-02-16 RX ADMIN — CELECOXIB 200 MILLIGRAM(S): 200 CAPSULE ORAL at 09:18

## 2018-02-16 RX ADMIN — Medication 162 MILLIGRAM(S): at 21:13

## 2018-02-16 RX ADMIN — OXYCODONE HYDROCHLORIDE 10 MILLIGRAM(S): 5 TABLET ORAL at 12:21

## 2018-02-16 RX ADMIN — Medication 1000 MILLIGRAM(S): at 22:12

## 2018-02-16 RX ADMIN — OXYCODONE HYDROCHLORIDE 10 MILLIGRAM(S): 5 TABLET ORAL at 05:51

## 2018-02-16 RX ADMIN — Medication 100 MILLIGRAM(S): at 05:52

## 2018-02-16 RX ADMIN — Medication 162 MILLIGRAM(S): at 09:13

## 2018-02-16 RX ADMIN — Medication 1000 MILLIGRAM(S): at 02:37

## 2018-02-16 RX ADMIN — OXYCODONE HYDROCHLORIDE 10 MILLIGRAM(S): 5 TABLET ORAL at 06:14

## 2018-02-16 RX ADMIN — Medication 100 MILLIGRAM(S): at 15:08

## 2018-02-16 RX ADMIN — Medication 90 MILLIGRAM(S): at 05:52

## 2018-02-16 RX ADMIN — Medication 400 MILLIGRAM(S): at 09:17

## 2018-02-16 RX ADMIN — Medication 1000 MILLIGRAM(S): at 15:08

## 2018-02-16 RX ADMIN — Medication 400 MILLIGRAM(S): at 02:34

## 2018-02-16 RX ADMIN — OXYCODONE HYDROCHLORIDE 5 MILLIGRAM(S): 5 TABLET ORAL at 21:15

## 2018-02-16 RX ADMIN — OXYCODONE HYDROCHLORIDE 5 MILLIGRAM(S): 5 TABLET ORAL at 22:00

## 2018-02-16 RX ADMIN — Medication 1000 MILLIGRAM(S): at 22:18

## 2018-02-16 RX ADMIN — PANTOPRAZOLE SODIUM 40 MILLIGRAM(S): 20 TABLET, DELAYED RELEASE ORAL at 12:21

## 2018-02-16 NOTE — PHYSICAL THERAPY INITIAL EVALUATION ADULT - ADDITIONAL COMMENTS
Lives in house with spouse.  6 stairs to enter with railing; 13 inside with railing.
has 6 janice with railing and 13 with railing in house  pt wants rehab placement

## 2018-02-16 NOTE — PROGRESS NOTE ADULT - SUBJECTIVE AND OBJECTIVE BOX
Patient is a 72y old  Female who presents with a chief complaint of Left knee pain (14 Feb 2018 16:33)      Subjective: feels well. pain tolerable   good appetite.  participating in PT.     MEDICATIONS  (STANDING):  acetaminophen   Tablet. 1000 milliGRAM(s) Oral every 8 hours  aspirin enteric coated 162 milliGRAM(s) Oral every 12 hours  celecoxib 200 milliGRAM(s) Oral two times a day with meals  docusate sodium 100 milliGRAM(s) Oral three times a day  influenza   Vaccine 0.5 milliLiter(s) IntraMuscular once  lactated ringers. 1000 milliLiter(s) (100 mL/Hr) IV Continuous <Continuous>  pantoprazole    Tablet 40 milliGRAM(s) Oral daily  senna 2 Tablet(s) Oral at bedtime  thyroid 90 milliGRAM(s) Oral daily    MEDICATIONS  (PRN):  aluminum hydroxide/magnesium hydroxide/simethicone Suspension 30 milliLiter(s) Oral four times a day PRN Indigestion  HYDROmorphone  Injectable 0.5 milliGRAM(s) IV Push every 3 hours PRN Severe Pain (7 - 10)  magnesium hydroxide Suspension 30 milliLiter(s) Oral daily PRN Constipation  ondansetron Injectable 4 milliGRAM(s) IV Push every 6 hours PRN Nausea and/or Vomiting  oxyCODONE    IR 5 milliGRAM(s) Oral every 3 hours PRN Mild Pain (1 - 3)  oxyCODONE    IR 10 milliGRAM(s) Oral every 3 hours PRN Moderate Pain (4 - 6)  polyethylene glycol 3350 17 Gram(s) Oral daily PRN Constipation      Allergies    No Known Allergies    Intolerances        REVIEW OF SYSTEMS:  negative unless otherwise specified above.    Vital Signs Last 24 Hrs  T(C): 36.8 (16 Feb 2018 11:41), Max: 36.8 (15 Feb 2018 21:00)  T(F): 98.2 (16 Feb 2018 11:41), Max: 98.3 (16 Feb 2018 07:57)  HR: 59 (16 Feb 2018 11:41) (56 - 95)  BP: 87/51 (16 Feb 2018 11:41) (87/50 - 133/53)  BP(mean): --  RR: 16 (16 Feb 2018 11:41) (14 - 17)  SpO2: 96% (16 Feb 2018 11:41) (96% - 100%)    PHYSICAL EXAM:  GENERAL: No apparent distress  HEAD:  Atraumatic, Normocephalic  EYES: conjunctiva and sclera clear  ENMT: Moist mucous membranes  NECK: Supple  CHEST/LUNG: Clear to auscultation bilaterally  HEART: Regular rate and rhythm  ABDOMEN: Soft, Nontender, Nondistended; Bowel sounds present  EXTREMITIES:  No clubbing, cyanosis or edema  SKIN: No rashes or lesions  NERVOUS SYSTEM:  Alert & Oriented X3; Bilateral Lower extremity mobile, sensation to light touch intact  INCISION:  Dressing dry and intact        LABS:   Platelet Count - Automated: 182 K/uL (02-16 @ 08:04)  RBC Count: 3.80 M/uL (02-16 @ 08:04)  Hemoglobin: 11.4 g/dL <L> (02-16 @ 08:04)  WBC Count: 6.3 K/uL (02-16 @ 08:04)  Hematocrit: 34.8 % (02-16 @ 08:04)  Hematocrit: 36.9 % (02-15 @ 20:00)  Hemoglobin: 11.9 g/dL (02-15 @ 20:00)      02-16    141  |  103  |  14  ----------------------------<  99  3.8   |  29  |  0.72    Ca    8.6      16 Feb 2018 08:04                  PT/INR - ( 16 Feb 2018 08:14 )   PT: 12.1 sec;   INR: 1.11 ratio         PTT - ( 16 Feb 2018 08:14 )  PTT:33.6 sec                      IMAGING: images reviewed personally      Consultant Notes Reviewed and Care Discussed with relevant Consultants/Other Providers.

## 2018-02-16 NOTE — PHYSICAL THERAPY INITIAL EVALUATION ADULT - RANGE OF MOTION EXAMINATION, REHAB EVAL
left knee ~10-70 degrees/bilateral upper extremity ROM was WFL (within functional limits)/deficits as listed below/Right LE ROM was WFL (within functional limits)
0-60 right knee/deficits as listed below

## 2018-02-16 NOTE — DISCHARGE NOTE ADULT - INSTRUCTIONS
For Constipation :   • Increase your water intake. Drink at least 8 glasses of water daily.  • Try adding fiber to your diet by eating fruits, vegetables and foods that are rich in grains.  • If you do experience constipation, you may take an over-the-counter stool softener/laxative such as Joanna Colace, [place] or  Milk of Magnesia. keep dressing clean and dry wear la nena brace at all times when sitting or ambulating

## 2018-02-16 NOTE — OCCUPATIONAL THERAPY INITIAL EVALUATION ADULT - PERTINENT HX OF CURRENT PROBLEM, REHAB EVAL
Left TKR (May walk in room only till [place] brace delivered 2/16. Must have Lisbet applied, unlocked and hinging when chair sitting and walking around. ) Left TKR (+ MCL laxity, May walk in room only till [place] brace delivered 2/16. Must have Lisbet applied, unlocked and hinging when chair sitting and walking around. )

## 2018-02-16 NOTE — PHYSICAL THERAPY INITIAL EVALUATION ADULT - GAIT TRAINING, PT EVAL
Goals (3-5 sessions): Ambulate with rolling walker 150 feet with supervision
Goals 3-5 days, Pt will ambulate 50x2 rw cga

## 2018-02-16 NOTE — DISCHARGE NOTE ADULT - CARE PLAN
Principal Discharge DX:	Primary osteoarthritis of left knee  Goal:	Improvement of Activities of Daily Living  Assessment and plan of treatment:	Your new total knee requires proper care.  Your care provider is your best resource for care information.  Please follow these basic guidelines.  Use pain medication if needed as prescribed.  Ice packs are a helpful addition to your comfort.  Applying a  waterproof ice pack over a cloth or thin towel to the site for 30 minutes per hour may provide benefit by reducing swelling and discomfort.  Your Physical Therapy/Occupational Therapy may include ambulation, transfers, stairs, ADL's (activities of daily living), range of motion exercises, and isometrics.  Your participation is vital to your recovery.    -Your Activity  • Weight Bearing as tolerated with rolling walker.  • Take short, frequent walks increasing the distance that you walk each day as tolerated.  • Change your position every hour to decrease pain and stiffness.  • Continue the exercises taught to you by your physical therapist.  • No driving until cleared by the doctor.  • No tub baths, hot tubs, or swimming pools until instructed by your doctor.  • Do not squat down on the floor.  • Do not kneel or twist your knee.    Keep incision clean and dry. Change the dressing daily if there is drainage noted from surgical wound. When there is no drainage, the wound may be left open to air.  Salves, ointments or other topical treatments are not to be used on the wound unless specifically recommended by your doctor.   If you have sutures (stiches) and no drainage form the incision you may shower allowing water to rinse the incision and pat it  dry afterward with a clean towel.  If you have Prineo (tape/glue) you may shower and pat the wound dry.  Prineo removal is done in the office 2 weeks after surgery.    If you have further questions or problems call your doctors office.

## 2018-02-16 NOTE — DISCHARGE NOTE ADULT - PATIENT PORTAL LINK FT
You can access the [facility], offered by [facility], by registering with the following website: [email or web address]

## 2018-02-16 NOTE — PHYSICAL THERAPY INITIAL EVALUATION ADULT - MANUAL MUSCLE TESTING RESULTS, REHAB EVAL
bilateral UEs 5/5; right LE 5/5; left LE 4/5/grossly assessed due to
grossly assessed due to/3+/5 for ble

## 2018-02-16 NOTE — DISCHARGE NOTE ADULT - PLAN OF CARE
Improvement of Activities of Daily Living Your new total knee requires proper care.  Your care provider is your best resource for care information.  Please follow these basic guidelines.  Use pain medication if needed as prescribed.  Ice packs are a helpful addition to your comfort.  Applying a  waterproof ice pack over a cloth or thin towel to the site for 30 minutes per hour may provide benefit by reducing swelling and discomfort.  Your Physical Therapy/Occupational Therapy may include ambulation, transfers, stairs, ADL's (activities of daily living), range of motion exercises, and isometrics.  Your participation is vital to your recovery.    -Your Activity  • Weight Bearing as tolerated with rolling walker.  • Take short, frequent walks increasing the distance that you walk each day as tolerated.  • Change your position every hour to decrease pain and stiffness.  • Continue the exercises taught to you by your physical therapist.  • No driving until cleared by the doctor.  • No tub baths, hot tubs, or swimming pools until instructed by your doctor.  • Do not squat down on the floor.  • Do not kneel or twist your knee.    Keep incision clean and dry. Change the dressing daily if there is drainage noted from surgical wound. When there is no drainage, the wound may be left open to air.  Salves, ointments or other topical treatments are not to be used on the wound unless specifically recommended by your doctor.   If you have sutures (stiches) and no drainage form the incision you may shower allowing water to rinse the incision and pat it  dry afterward with a clean towel.  If you have Prineo (tape/glue) you may shower and pat the wound dry.  Prineo removal is done in the office 2 weeks after surgery.    If you have further questions or problems call your doctors office.

## 2018-02-16 NOTE — DISCHARGE NOTE ADULT - CARE PROVIDER_API CALL
IVY Haley (MD), Orthopaedic Surgery  [address] [place] [address]  Phone: [phone]  Fax: [phone]

## 2018-02-16 NOTE — PHYSICAL THERAPY INITIAL EVALUATION ADULT - TRANSFER TRAINING, PT EVAL
Goals (3-5 sessions): Sit<->stand at walker with supervision
Goals 3-5 days, Pt will perform sit to stand w/ rolling walker sup

## 2018-02-16 NOTE — PROGRESS NOTE ADULT - SUBJECTIVE AND OBJECTIVE BOX
Order received to provide Lisbet knee brace free range of motion to be used following TKR  Brace applied at set free motion  Pt instructed to kelli and dothelma  Written instructions left at bedside    Follow up as needed    George MORENO  allpro [phone]

## 2018-02-16 NOTE — DISCHARGE NOTE ADULT - MEDICATION SUMMARY - MEDICATIONS TO TAKE
I will START or STAY ON the medications listed below when I get home from the hospital:    acetaminophen 500 mg oral tablet  -- 2 tab(s) by mouth every 8 hours  -- Indication: For Pain    oxyCODONE 10 mg oral tablet  -- 1 tab(s) by mouth every 3 hours, As needed, Moderate Pain (4 - 6)  -- Indication: For Pain    oxyCODONE 5 mg oral tablet  -- 1 tab(s) by mouth every 3 hours, As needed, Mild Pain (1 - 3)  -- Indication: For Pain    celecoxib 200 mg oral capsule  -- 1 cap(s) by mouth 2 times a day (with meals)  -- Indication: For Pain    aspirin 81 mg oral delayed release tablet  -- 2 tab(s) by mouth every 12 hours  -- Indication: For Blood Clot Prevention    magnesium hydroxide 8% oral suspension  -- 30 milliliter(s) by mouth once a day, As needed, Constipation  -- Indication: For Constipation    aluminum hydroxide-magnesium hydroxide 200 mg-200 mg/5 mL oral suspension  -- 30 milliliter(s) by mouth 4 times a day, As needed, Indigestion  -- Indication: For Constipation    senna oral tablet  -- 2 tab(s) by mouth once a day (at bedtime)  -- Indication: For Constipation    bisacodyl 10 mg rectal suppository  -- 1 suppository(ies) rectally once a day, As needed, If no bowel movement by postoperative day #2  -- Indication: For Constipation    docusate sodium 100 mg oral capsule  -- 1 cap(s) by mouth 3 times a day  -- Indication: For Constipation    polyethylene glycol 3350 oral powder for reconstitution  -- 17 gram(s) by mouth once a day, As needed, Constipation  -- Indication: For Constipation    pantoprazole 40 mg oral delayed release tablet  -- 1 tab(s) by mouth once a day  -- Indication: For Stomach Protection    NP Thyroid 90 mg oral tablet  -- 1 tab(s) by mouth once a day  -- Indication: For Hypothyroid I will START or STAY ON the medications listed below when I get home from the hospital:    acetaminophen 500 mg oral tablet  -- 2 tab(s) by mouth every 8 hours  -- Indication: For Pain    celecoxib 200 mg oral capsule  -- 1 cap(s) by mouth 2 times a day (with meals)  -- Indication: For Pain    aspirin 81 mg oral delayed release tablet  -- 2 tab(s) by mouth every 12 hours  -- Indication: For Blood Clot Prevention    traMADol 50 mg oral tablet  -- 1 tab(s) by mouth every 3 hours, As needed, Mild Pain (1 - 3)  -- Indication: For Pain    traMADol 50 mg oral tablet  -- 2 tab(s) by mouth every 4 hours, As needed, Moderate Pain (4 - 6)  -- Indication: For Pain    magnesium hydroxide 8% oral suspension  -- 30 milliliter(s) by mouth once a day, As needed, Constipation  -- Indication: For Constipation    aluminum hydroxide-magnesium hydroxide 200 mg-200 mg/5 mL oral suspension  -- 30 milliliter(s) by mouth 4 times a day, As needed, Indigestion  -- Indication: For Constipation    senna oral tablet  -- 2 tab(s) by mouth once a day (at bedtime)  -- Indication: For Constipation    bisacodyl 10 mg rectal suppository  -- 1 suppository(ies) rectally once a day, As needed, If no bowel movement by postoperative day #2  -- Indication: For Constipation    docusate sodium 100 mg oral capsule  -- 1 cap(s) by mouth 3 times a day  -- Indication: For Constipation    polyethylene glycol 3350 oral powder for reconstitution  -- 17 gram(s) by mouth once a day, As needed, Constipation  -- Indication: For Constipation    pantoprazole 40 mg oral delayed release tablet  -- 1 tab(s) by mouth once a day  -- Indication: For Stomach Protection    NP Thyroid 90 mg oral tablet  -- 1 tab(s) by mouth once a day  -- Indication: For Hypothyroid

## 2018-02-16 NOTE — PROGRESS NOTE ADULT - SUBJECTIVE AND OBJECTIVE BOX
ORTHOPEDIC PA PROGRESS NOTE  HENNY CAGE      72y Female                                                                                                                               POD #1      STATUS POST:               Pre-Op Dx: Primary localized osteoarthritis of left knee      Post-Op Dx:  Primary localized osteoarthritis of left knee      Procedure: Left total knee replacement                                                Patient is complaining of moderate pain.  Pain is improved and tolerable after pain medicine.  She recieved 10mg of oxycodone a few hours ago with good relief.  She has no other complinats..  Voiding urine passing gas and tolerating p.o diet.  Current Pain Management:  [ ] PCA   [x ] Po Analgesics [x ] IM /IV Anagesics     T(F): 98.2  HR: 59  BP: 87/51  RR: 16  SpO2: 96%                        11.4   6.3   )-----------( 182      ( 16 Feb 2018 08:04 )             34.8                     02-16    141  |  103  |  14  ----------------------------<  99  3.8   |  29  |  0.72    Ca    8.6      16 Feb 2018 08:04      Physical Exam :    -   Dressing clean and dry la nena in place and hemovac intact  -   Distal Neurvascular status intact grossly.   -   Warm well perfused; capillary refill <3 seconds   -   (+)EHL/FHL 5/5 dorsi plantar flexion 5/5  -   (+) Sensation to light touch  -   (-) Calf tenderness Bilaterally    A/P: 72y Female s/p Primary localized osteoarthritis of left knee  Left total knee replacement    -   Ortho Stable  -   Pain control   -   Medicine to follow  -   DVT ppx:     [ x]SCDs     [ x] ASA     [ ] Eliquis     [ ] Lovenox  -   Weight bearing status:  WBAT [x ]        PWB    [ ]     TTWB  [ ]      NWB  [ ]   -  Dispo:     Home [ ]     Acute Rehab [ ]     [facility] [ x]     TBD [ ]

## 2018-02-16 NOTE — OCCUPATIONAL THERAPY INITIAL EVALUATION ADULT - ANTICIPATED DISCHARGE DISPOSITION, OT EVAL
OT recommending home however pt requesting rehab. Pt requesting "the most amount of therapy to bend my knee as I haven't been able to bend in years"

## 2018-02-16 NOTE — DISCHARGE NOTE ADULT - HOSPITAL COURSE
HENNY CAGE    Admitted on 02-15-18     72y y.o.  Female with history of Primary localized osteoarthritis of left knee  Primary localized osteoarthritis of right knee    Surgery:   Left total knee replacement  Primary localized osteoarthritis of right knee    Orthopedic Surgeon:   Dr. KRISTINA Haley    Joanna-operative antibiotic:    ceFAZolin   IVPB:,       Consulted Services : Hospitalist, Physical Therapy, Occupational Therapy    Typical Physical & occupational therapy modalities post Left total knee replacement  Primary localized osteoarthritis of right knee   were performed including ambulation training, range of motion, ADL's, and transfers.     DVT prophylaxis:    aspirin enteric coated: 162 milliGRAM(s),  162 milliGRAM(s)       The patient had a clean appearing surgical incision with no sign of surgical site infections and had a stable neuro / vascular exam of the operated extremity.  After progression of mobility guided by the PT/ OT staff,  the patient was felt to benefit from further rehabilitative care for restoration to level of function. This was felt to best be accomplished in Rehab.    Discharge and Orthopedic Care instructions were delineated in the Discharge Plan and reviewed with the patient. All medications were delineated in the medication reconciliation tool and key points were reviewed with the patient.     They were deemed stable from an Orthopedic & medical standpoint for discharge today.  Upon discharge from the rehab facility they will be  following up with Dr. KRISTINA Haley for office  follow up Orthopedic care.

## 2018-02-16 NOTE — DISCHARGE NOTE ADULT - MEDICATION SUMMARY - MEDICATIONS TO CHANGE
I will SWITCH the dose or number of times a day I take the medications listed below when I get home from the hospital:    acetaminophen 325 mg oral tablet  -- 2 tab(s) by mouth every 6 hours, As needed, Mild Pain

## 2018-02-17 VITALS
HEART RATE: 76 BPM | SYSTOLIC BLOOD PRESSURE: 92 MMHG | TEMPERATURE: 98 F | DIASTOLIC BLOOD PRESSURE: 58 MMHG | RESPIRATION RATE: 16 BRPM | OXYGEN SATURATION: 96 %

## 2018-02-17 LAB
ANION GAP SERPL CALC-SCNC: 6 MMOL/L — SIGNIFICANT CHANGE UP (ref 5–17)
BUN SERPL-MCNC: 15 MG/DL — SIGNIFICANT CHANGE UP (ref 7–23)
CALCIUM SERPL-MCNC: 9 MG/DL — SIGNIFICANT CHANGE UP (ref 8.4–10.5)
CHLORIDE SERPL-SCNC: 103 MMOL/L — SIGNIFICANT CHANGE UP (ref 96–108)
CO2 SERPL-SCNC: 32 MMOL/L — HIGH (ref 22–31)
CREAT SERPL-MCNC: 0.67 MG/DL — SIGNIFICANT CHANGE UP (ref 0.5–1.3)
GLUCOSE SERPL-MCNC: 112 MG/DL — HIGH (ref 70–99)
HCT VFR BLD CALC: 33.3 % — LOW (ref 34.5–45)
HGB BLD-MCNC: 11.2 G/DL — LOW (ref 11.5–15.5)
MCHC RBC-ENTMCNC: 30.8 PG — SIGNIFICANT CHANGE UP (ref 27–34)
MCHC RBC-ENTMCNC: 33.5 GM/DL — SIGNIFICANT CHANGE UP (ref 32–36)
MCV RBC AUTO: 92 FL — SIGNIFICANT CHANGE UP (ref 80–100)
PLATELET # BLD AUTO: 201 K/UL — SIGNIFICANT CHANGE UP (ref 150–400)
POTASSIUM SERPL-MCNC: 4.2 MMOL/L — SIGNIFICANT CHANGE UP (ref 3.5–5.3)
POTASSIUM SERPL-SCNC: 4.2 MMOL/L — SIGNIFICANT CHANGE UP (ref 3.5–5.3)
RBC # BLD: 3.62 M/UL — LOW (ref 3.8–5.2)
RBC # FLD: 12.4 % — SIGNIFICANT CHANGE UP (ref 10.3–14.5)
SODIUM SERPL-SCNC: 141 MMOL/L — SIGNIFICANT CHANGE UP (ref 135–145)
WBC # BLD: 6.2 K/UL — SIGNIFICANT CHANGE UP (ref 3.8–10.5)
WBC # FLD AUTO: 6.2 K/UL — SIGNIFICANT CHANGE UP (ref 3.8–10.5)

## 2018-02-17 PROCEDURE — 73560 X-RAY EXAM OF KNEE 1 OR 2: CPT

## 2018-02-17 PROCEDURE — C1776: CPT

## 2018-02-17 PROCEDURE — 88311 DECALCIFY TISSUE: CPT

## 2018-02-17 PROCEDURE — 85018 HEMOGLOBIN: CPT

## 2018-02-17 PROCEDURE — 85730 THROMBOPLASTIN TIME PARTIAL: CPT

## 2018-02-17 PROCEDURE — 97110 THERAPEUTIC EXERCISES: CPT

## 2018-02-17 PROCEDURE — 97165 OT EVAL LOW COMPLEX 30 MIN: CPT

## 2018-02-17 PROCEDURE — 97161 PT EVAL LOW COMPLEX 20 MIN: CPT

## 2018-02-17 PROCEDURE — 73562 X-RAY EXAM OF KNEE 3: CPT

## 2018-02-17 PROCEDURE — 85027 COMPLETE CBC AUTOMATED: CPT

## 2018-02-17 PROCEDURE — 99233 SBSQ HOSP IP/OBS HIGH 50: CPT

## 2018-02-17 PROCEDURE — C1713: CPT

## 2018-02-17 PROCEDURE — 97530 THERAPEUTIC ACTIVITIES: CPT

## 2018-02-17 PROCEDURE — 88305 TISSUE EXAM BY PATHOLOGIST: CPT

## 2018-02-17 PROCEDURE — 97116 GAIT TRAINING THERAPY: CPT

## 2018-02-17 PROCEDURE — 80051 ELECTROLYTE PANEL: CPT

## 2018-02-17 PROCEDURE — 85610 PROTHROMBIN TIME: CPT

## 2018-02-17 PROCEDURE — 80048 BASIC METABOLIC PNL TOTAL CA: CPT

## 2018-02-17 PROCEDURE — C1889: CPT

## 2018-02-17 RX ORDER — PANTOPRAZOLE SODIUM 20 MG/1
1 TABLET, DELAYED RELEASE ORAL
Qty: 0 | Refills: 0 | COMMUNITY
Start: 2018-02-17

## 2018-02-17 RX ORDER — SENNA PLUS 8.6 MG/1
2 TABLET ORAL
Qty: 0 | Refills: 0 | COMMUNITY
Start: 2018-02-17

## 2018-02-17 RX ORDER — TRAMADOL HYDROCHLORIDE 50 MG/1
2 TABLET ORAL
Qty: 0 | Refills: 0 | COMMUNITY
Start: 2018-02-17

## 2018-02-17 RX ORDER — TRAMADOL HYDROCHLORIDE 50 MG/1
50 TABLET ORAL
Qty: 0 | Refills: 0 | Status: DISCONTINUED | OUTPATIENT
Start: 2018-02-17 | End: 2018-02-17

## 2018-02-17 RX ORDER — CELECOXIB 200 MG/1
1 CAPSULE ORAL
Qty: 0 | Refills: 0 | COMMUNITY
Start: 2018-02-17

## 2018-02-17 RX ORDER — OXYCODONE HYDROCHLORIDE 5 MG/1
5 TABLET ORAL ONCE
Qty: 0 | Refills: 0 | Status: DISCONTINUED | OUTPATIENT
Start: 2018-02-17 | End: 2018-02-17

## 2018-02-17 RX ORDER — MAGNESIUM HYDROXIDE 400 MG/1
30 TABLET, CHEWABLE ORAL
Qty: 0 | Refills: 0 | COMMUNITY
Start: 2018-02-17

## 2018-02-17 RX ORDER — ACETAMINOPHEN 500 MG
2 TABLET ORAL
Qty: 0 | Refills: 0 | COMMUNITY
Start: 2018-02-17

## 2018-02-17 RX ORDER — DOCUSATE SODIUM 100 MG
1 CAPSULE ORAL
Qty: 0 | Refills: 0 | COMMUNITY
Start: 2018-02-17

## 2018-02-17 RX ORDER — TRAMADOL HYDROCHLORIDE 50 MG/1
100 TABLET ORAL EVERY 4 HOURS
Qty: 0 | Refills: 0 | Status: DISCONTINUED | OUTPATIENT
Start: 2018-02-17 | End: 2018-02-17

## 2018-02-17 RX ORDER — POLYETHYLENE GLYCOL 3350 17 G/17G
17 POWDER, FOR SOLUTION ORAL
Qty: 0 | Refills: 0 | COMMUNITY
Start: 2018-02-17

## 2018-02-17 RX ORDER — OXYCODONE HYDROCHLORIDE 5 MG/1
1 TABLET ORAL
Qty: 0 | Refills: 0 | COMMUNITY
Start: 2018-02-17

## 2018-02-17 RX ORDER — TRAMADOL HYDROCHLORIDE 50 MG/1
1 TABLET ORAL
Qty: 0 | Refills: 0 | COMMUNITY
Start: 2018-02-17

## 2018-02-17 RX ADMIN — Medication 100 MILLIGRAM(S): at 14:24

## 2018-02-17 RX ADMIN — OXYCODONE HYDROCHLORIDE 5 MILLIGRAM(S): 5 TABLET ORAL at 06:32

## 2018-02-17 RX ADMIN — OXYCODONE HYDROCHLORIDE 5 MILLIGRAM(S): 5 TABLET ORAL at 09:07

## 2018-02-17 RX ADMIN — OXYCODONE HYDROCHLORIDE 5 MILLIGRAM(S): 5 TABLET ORAL at 04:51

## 2018-02-17 RX ADMIN — OXYCODONE HYDROCHLORIDE 5 MILLIGRAM(S): 5 TABLET ORAL at 06:25

## 2018-02-17 RX ADMIN — OXYCODONE HYDROCHLORIDE 5 MILLIGRAM(S): 5 TABLET ORAL at 10:00

## 2018-02-17 RX ADMIN — Medication 162 MILLIGRAM(S): at 08:34

## 2018-02-17 RX ADMIN — Medication 100 MILLIGRAM(S): at 05:21

## 2018-02-17 RX ADMIN — CELECOXIB 200 MILLIGRAM(S): 200 CAPSULE ORAL at 09:12

## 2018-02-17 RX ADMIN — CELECOXIB 200 MILLIGRAM(S): 200 CAPSULE ORAL at 08:33

## 2018-02-17 RX ADMIN — Medication 1000 MILLIGRAM(S): at 14:50

## 2018-02-17 RX ADMIN — TRAMADOL HYDROCHLORIDE 100 MILLIGRAM(S): 50 TABLET ORAL at 11:30

## 2018-02-17 RX ADMIN — TRAMADOL HYDROCHLORIDE 100 MILLIGRAM(S): 50 TABLET ORAL at 10:41

## 2018-02-17 RX ADMIN — Medication 1000 MILLIGRAM(S): at 06:25

## 2018-02-17 RX ADMIN — Medication 90 MILLIGRAM(S): at 05:21

## 2018-02-17 RX ADMIN — PANTOPRAZOLE SODIUM 40 MILLIGRAM(S): 20 TABLET, DELAYED RELEASE ORAL at 14:24

## 2018-02-17 RX ADMIN — Medication 1000 MILLIGRAM(S): at 06:26

## 2018-02-17 RX ADMIN — OXYCODONE HYDROCHLORIDE 5 MILLIGRAM(S): 5 TABLET ORAL at 05:30

## 2018-02-17 RX ADMIN — Medication 1000 MILLIGRAM(S): at 14:24

## 2018-02-17 NOTE — PROGRESS NOTE ADULT - ASSESSMENT
72f with    1. primary severe osteoarthritis of joint s/p left TKR: iv dilaudid + bowel regimen. monitor for lethargy or respiratory depression on opiate therapy.  Physical Therapy evaluation.  Discussed with orthopedic service Physician Assistant.     2. hypotension - given IVF bolus to avoid orthostatic symptoms    3. hypothyroid: home med    4. thoracic aortic aneurysm: outpt vascular f/up.    5. dvt prophylaxis per orthopedic protocol     6. dispo: home in 1-2 days
72f with    1. primary severe osteoarthritis of joint s/p left TKR: trmadol + bowel regimen. monitor for lethargy or respiratory depression on opiate therapy.  Physical Therapy evaluation.      2. hypotension - asymptomatics. improved after iVF.    3. hypothyroid: home med    4. thoracic aortic aneurysm: outpt vascular f/up.    5. dvt prophylaxis per orthopedic protocol     6. dispo: [facility] today.    &. post op anemia due to acute blood loss ; check cbc prn.

## 2018-02-17 NOTE — PROGRESS NOTE ADULT - SUBJECTIVE AND OBJECTIVE BOX
Patient is a 72y old  Female who presents with a chief complaint of Left Total Knee Replacement (16 Feb 2018 13:56)      INTERVAL HPI/OVERNIGHT EVENTS:  Pt is seen and examined.  nissa is controlled. No new complaints.    Pain Location & Control:     MEDICATIONS  (STANDING):  acetaminophen   Tablet. 1000 milliGRAM(s) Oral every 8 hours  aspirin enteric coated 162 milliGRAM(s) Oral every 12 hours  celecoxib 200 milliGRAM(s) Oral two times a day with meals  docusate sodium 100 milliGRAM(s) Oral three times a day  influenza   Vaccine 0.5 milliLiter(s) IntraMuscular once  lactated ringers. 1000 milliLiter(s) (100 mL/Hr) IV Continuous <Continuous>  pantoprazole    Tablet 40 milliGRAM(s) Oral daily  senna 2 Tablet(s) Oral at bedtime  thyroid 90 milliGRAM(s) Oral daily    MEDICATIONS  (PRN):  aluminum hydroxide/magnesium hydroxide/simethicone Suspension 30 milliLiter(s) Oral four times a day PRN Indigestion  bisacodyl Suppository 10 milliGRAM(s) Rectal daily PRN If no bowel movement by postoperative day #2  HYDROmorphone  Injectable 0.5 milliGRAM(s) IV Push every 3 hours PRN Severe Pain (7 - 10)  magnesium hydroxide Suspension 30 milliLiter(s) Oral daily PRN Constipation  ondansetron Injectable 4 milliGRAM(s) IV Push every 6 hours PRN Nausea and/or Vomiting  polyethylene glycol 3350 17 Gram(s) Oral daily PRN Constipation  traMADol 50 milliGRAM(s) Oral every 3 hours PRN Mild Pain (1 - 3)  traMADol 100 milliGRAM(s) Oral every 4 hours PRN Moderate Pain (4 - 6)      Allergies    No Known Allergies    Intolerances            Vital Signs Last 24 Hrs  T(C): 36.4 (17 Feb 2018 08:11), Max: 36.8 (16 Feb 2018 11:41)  T(F): 97.6 (17 Feb 2018 08:11), Max: 98.2 (16 Feb 2018 11:41)  HR: 68 (17 Feb 2018 08:11) (59 - 68)  BP: 96/63 (17 Feb 2018 08:11) (87/51 - 110/62)  BP(mean): --  RR: 18 (17 Feb 2018 08:11) (16 - 18)  SpO2: 96% (17 Feb 2018 08:11) (96% - 100%)        LABS:                        11.2   6.2   )-----------( 201      ( 17 Feb 2018 07:03 )             33.3     17 Feb 2018 07:00    141    |  103    |  15     ----------------------------<  112    4.2     |  32     |  0.67     Ca    9.0        17 Feb 2018 07:00      PT/INR - ( 16 Feb 2018 08:14 )   PT: 12.1 sec;   INR: 1.11 ratio         PTT - ( 16 Feb 2018 08:14 )  PTT:33.6 sec    CAPILLARY BLOOD GLUCOSE  RADIOLOGY & ADDITIONAL TESTS:    Imaging Personally Reviewed:  [ ] YES  [ ] NO    Consultant(s) Notes Reviewed:  [ ] YES  [ ] NO    Care Discussed with Consultants/Other Providers [ x] YES  [ ] NO

## 2018-02-17 NOTE — PROGRESS NOTE ADULT - SUBJECTIVE AND OBJECTIVE BOX
SUBJECTIVE: Patient seen and examined. No nausea/vomiting, nor shortness of breath. Patient has progressed well with PT    OBJECTIVE:     Vital Signs Last 24 Hrs  T(C): 36.4 (17 Feb 2018 08:11), Max: 36.8 (16 Feb 2018 15:30)  T(F): 97.6 (17 Feb 2018 08:11), Max: 98.2 (16 Feb 2018 15:30)  HR: 68 (17 Feb 2018 08:11) (66 - 68)  BP: 96/63 (17 Feb 2018 08:11) (91/60 - 110/62)  BP(mean): --  RR: 18 (17 Feb 2018 08:11) (16 - 18)  SpO2: 96% (17 Feb 2018 08:11) (96% - 100%)    PAIN SCORE:     2    SCALE USED: (1-10 VNRS)        Affected extremity:          Dressing: clean/dry/intact post operative dressing of the left total knee, removed hemovacs. Sutered surgical incision. changed dressing         Sensation:  intact to light touch         Motor exam: Calves are supple and nontender         5/ 5 Tibialis Anterior/Gastrocnemius-Soleus , EHL         warm well perfused; capillary refill <3 seconds     LABS:                        11.2   6.2   )-----------( 201      ( 17 Feb 2018 07:03 )             33.3     02-17    141  |  103  |  15  ----------------------------<  112<H>  4.2   |  32<H>  |  0.67    Ca    9.0      17 Feb 2018 07:00      PT/INR - ( 16 Feb 2018 08:14 )   PT: 12.1 sec;   INR: 1.11 ratio         PTT - ( 16 Feb 2018 08:14 )  PTT:33.6 sec  CAPILLARY BLOOD GLUCOSE          MEDICATIONS:    Anticoagulation:  aspirin enteric coated 162 milliGRAM(s) Oral every 12 hours      Antibiotics:       Pain medications:   acetaminophen   Tablet. 1000 milliGRAM(s) Oral every 8 hours  celecoxib 200 milliGRAM(s) Oral two times a day with meals  HYDROmorphone  Injectable 0.5 milliGRAM(s) IV Push every 3 hours PRN  ondansetron Injectable 4 milliGRAM(s) IV Push every 6 hours PRN  traMADol 50 milliGRAM(s) Oral every 3 hours PRN  traMADol 100 milliGRAM(s) Oral every 4 hours PRN      A/P :  s/p  Left TKR  POD # 2  -    Pain control  -    DVT ppx: ASA   -    Check AM labs  -    Weight bearing status: WBAT   -    Physical Therapy  -    Dispo: [facility]

## 2018-02-20 ENCOUNTER — MOBILE ON CALL (OUTPATIENT)
Age: 73
End: 2018-02-20

## 2018-02-20 RX ORDER — PANTOPRAZOLE SODIUM 20 MG/1
1 TABLET, DELAYED RELEASE ORAL
Qty: 30 | Refills: 1 | OUTPATIENT
Start: 2018-02-20 | End: 2018-04-20

## 2018-02-20 RX ORDER — CELECOXIB 200 MG/1
1 CAPSULE ORAL
Qty: 42 | Refills: 1 | OUTPATIENT
Start: 2018-02-20 | End: 2018-04-02

## 2018-02-28 ENCOUNTER — APPOINTMENT (OUTPATIENT)
Dept: ORTHOPEDIC SURGERY | Facility: CLINIC | Age: 73
End: 2018-02-28
Payer: MEDICARE

## 2018-02-28 DIAGNOSIS — M17.12 UNILATERAL PRIMARY OSTEOARTHRITIS, LEFT KNEE: ICD-10-CM

## 2018-02-28 PROCEDURE — 73562 X-RAY EXAM OF KNEE 3: CPT | Mod: LT

## 2018-02-28 PROCEDURE — 99024 POSTOP FOLLOW-UP VISIT: CPT

## 2018-03-21 ENCOUNTER — APPOINTMENT (OUTPATIENT)
Dept: ORTHOPEDIC SURGERY | Facility: CLINIC | Age: 73
End: 2018-03-21
Payer: MEDICARE

## 2018-03-21 PROCEDURE — 99024 POSTOP FOLLOW-UP VISIT: CPT

## 2018-04-11 ENCOUNTER — APPOINTMENT (OUTPATIENT)
Dept: ORTHOPEDIC SURGERY | Facility: CLINIC | Age: 73
End: 2018-04-11
Payer: MEDICARE

## 2018-04-11 PROCEDURE — 99024 POSTOP FOLLOW-UP VISIT: CPT

## 2018-05-16 ENCOUNTER — APPOINTMENT (OUTPATIENT)
Dept: ORTHOPEDIC SURGERY | Facility: CLINIC | Age: 73
End: 2018-05-16
Payer: MEDICARE

## 2018-05-16 PROCEDURE — 99024 POSTOP FOLLOW-UP VISIT: CPT

## 2018-06-18 ENCOUNTER — APPOINTMENT (OUTPATIENT)
Dept: CARDIOLOGY | Facility: CLINIC | Age: 73
End: 2018-06-18
Payer: MEDICARE

## 2018-06-18 ENCOUNTER — NON-APPOINTMENT (OUTPATIENT)
Age: 73
End: 2018-06-18

## 2018-06-18 VITALS
HEIGHT: 62 IN | WEIGHT: 140 LBS | SYSTOLIC BLOOD PRESSURE: 133 MMHG | DIASTOLIC BLOOD PRESSURE: 46 MMHG | OXYGEN SATURATION: 100 % | BODY MASS INDEX: 25.76 KG/M2 | HEART RATE: 57 BPM

## 2018-06-18 VITALS — DIASTOLIC BLOOD PRESSURE: 47 MMHG | SYSTOLIC BLOOD PRESSURE: 119 MMHG

## 2018-06-18 DIAGNOSIS — R00.1 BRADYCARDIA, UNSPECIFIED: ICD-10-CM

## 2018-06-18 PROCEDURE — 93000 ELECTROCARDIOGRAM COMPLETE: CPT

## 2018-06-18 PROCEDURE — 99214 OFFICE O/P EST MOD 30 MIN: CPT

## 2018-06-18 RX ORDER — OXYCODONE 5 MG/1
5 TABLET ORAL
Qty: 126 | Refills: 0 | Status: DISCONTINUED | COMMUNITY
Start: 2018-03-21 | End: 2018-06-18

## 2018-06-18 RX ORDER — OXYCODONE 10 MG/1
10 TABLET ORAL 4 TIMES DAILY
Qty: 84 | Refills: 0 | Status: DISCONTINUED | COMMUNITY
Start: 2018-02-19 | End: 2018-06-18

## 2018-06-20 ENCOUNTER — APPOINTMENT (OUTPATIENT)
Dept: ORTHOPEDIC SURGERY | Facility: CLINIC | Age: 73
End: 2018-06-20
Payer: MEDICARE

## 2018-06-20 PROCEDURE — 99213 OFFICE O/P EST LOW 20 MIN: CPT

## 2018-06-20 PROCEDURE — 73562 X-RAY EXAM OF KNEE 3: CPT | Mod: LT

## 2018-07-04 ENCOUNTER — MOBILE ON CALL (OUTPATIENT)
Age: 73
End: 2018-07-04

## 2018-07-17 PROBLEM — I71.2 THORACIC AORTIC ANEURYSM, WITHOUT RUPTURE: Chronic | Status: ACTIVE | Noted: 2018-02-06

## 2018-07-25 ENCOUNTER — APPOINTMENT (OUTPATIENT)
Dept: ORTHOPEDIC SURGERY | Facility: CLINIC | Age: 73
End: 2018-07-25
Payer: MEDICARE

## 2018-07-25 VITALS — HEIGHT: 62 IN | WEIGHT: 140 LBS | BODY MASS INDEX: 25.76 KG/M2 | RESPIRATION RATE: 14 BRPM

## 2018-07-25 DIAGNOSIS — Z96.652 PRESENCE OF LEFT ARTIFICIAL KNEE JOINT: ICD-10-CM

## 2018-07-25 DIAGNOSIS — M48.061 SPINAL STENOSIS, LUMBAR REGION WITHOUT NEUROGENIC CLAUDICATION: ICD-10-CM

## 2018-07-25 PROCEDURE — 99213 OFFICE O/P EST LOW 20 MIN: CPT

## 2019-02-04 ENCOUNTER — OTHER (OUTPATIENT)
Age: 74
End: 2019-02-04

## 2019-02-25 PROBLEM — I00 RHEUMATIC FEVER WITHOUT HEART INVOLVEMENT: Chronic | Status: ACTIVE | Noted: 2018-02-06

## 2019-02-25 PROBLEM — R00.1 BRADYCARDIA, UNSPECIFIED: Chronic | Status: ACTIVE | Noted: 2018-02-06

## 2019-03-06 ENCOUNTER — APPOINTMENT (OUTPATIENT)
Dept: ORTHOPEDIC SURGERY | Facility: CLINIC | Age: 74
End: 2019-03-06
Payer: MEDICARE

## 2019-03-06 DIAGNOSIS — Z96.642 PRESENCE OF LEFT ARTIFICIAL HIP JOINT: ICD-10-CM

## 2019-03-06 DIAGNOSIS — Z96.652 PRESENCE OF LEFT ARTIFICIAL KNEE JOINT: ICD-10-CM

## 2019-03-06 PROCEDURE — 99212 OFFICE O/P EST SF 10 MIN: CPT

## 2019-03-06 NOTE — PHYSICAL EXAM
[de-identified] : Well-nourished, in no acute distress\par Alert and oriented to time, place and person\par Skin: no lesions discoloration\par Respirations: unlabored\par Cardiac: no leg swelling\par Lymphatic: no groin adenopathy\par Left knee: healed midline incision, no effusion, neutral alignment, ROM 0-110°, mid range varus and valgus laxity

## 2019-03-06 NOTE — HISTORY OF PRESENT ILLNESS
[de-identified] : 73 year old female presents for follow up 1 year s/p left total knee replacement February 15, 2018. She reports having constant anteromedial knee pain. She is able to walk and ambulate stairs without difficulty, but has pain daily. She states she feels her knee might buckle, especially when carrying objects down staitrs The pain worsens if she is sitting for a prolonged period of time. Denies locking. \par

## 2019-03-06 NOTE — REASON FOR VISIT
[Follow-Up Visit] : a follow-up visit for [Artificial Knee Joint] : artificial knee joint [Spouse] : spouse

## 2019-03-06 NOTE — ADDENDUM
[FreeTextEntry1] : This note was written by Rita Hernandez on 03/06/2019 acting solely as a scribe for Dr. Scotty Haley.\par \par All medical record entries made by the Scribe were at my, Dr. Scotty Haley, direction and personally dictated by me on 03/06/2019. I have personally reviewed the chart and agree that the record accurately reflects my personal performance of the history, physical exam, assessment and plan.

## 2019-03-06 NOTE — DISCUSSION/SUMMARY
[de-identified] : 73 year old female presents with possible flexor instability 1 year s/p TKR. If her symptoms worsen, we may have to consider a revision surgery. She will seek a second opinion.

## 2019-03-11 LAB
ALBUMIN SERPL ELPH-MCNC: 4.3 G/DL
ALP BLD-CCNC: 70 U/L
ALT SERPL-CCNC: 20 U/L
ANION GAP SERPL CALC-SCNC: 10 MMOL/L
AST SERPL-CCNC: 22 U/L
BILIRUB SERPL-MCNC: 0.4 MG/DL
BUN SERPL-MCNC: 17 MG/DL
CALCIUM SERPL-MCNC: 9.3 MG/DL
CHLORIDE SERPL-SCNC: 106 MMOL/L
CHOLEST SERPL-MCNC: 188 MG/DL
CHOLEST/HDLC SERPL: 2.7 RATIO
CO2 SERPL-SCNC: 26 MMOL/L
CREAT SERPL-MCNC: 0.71 MG/DL
GLUCOSE SERPL-MCNC: 89 MG/DL
HDLC SERPL-MCNC: 69 MG/DL
LDLC SERPL CALC-MCNC: 104 MG/DL
POTASSIUM SERPL-SCNC: 4.2 MMOL/L
PROT SERPL-MCNC: 6.7 G/DL
SODIUM SERPL-SCNC: 142 MMOL/L
TRIGL SERPL-MCNC: 75 MG/DL

## 2019-06-24 ENCOUNTER — NON-APPOINTMENT (OUTPATIENT)
Age: 74
End: 2019-06-24

## 2019-06-24 ENCOUNTER — APPOINTMENT (OUTPATIENT)
Dept: CARDIOLOGY | Facility: CLINIC | Age: 74
End: 2019-06-24
Payer: MEDICARE

## 2019-06-24 VITALS
BODY MASS INDEX: 26.68 KG/M2 | HEIGHT: 62 IN | DIASTOLIC BLOOD PRESSURE: 69 MMHG | OXYGEN SATURATION: 100 % | WEIGHT: 145 LBS | HEART RATE: 56 BPM | SYSTOLIC BLOOD PRESSURE: 121 MMHG

## 2019-06-24 DIAGNOSIS — I71.2 THORACIC AORTIC ANEURYSM, W/OUT RUPTURE: ICD-10-CM

## 2019-06-24 PROCEDURE — 93000 ELECTROCARDIOGRAM COMPLETE: CPT

## 2019-06-24 PROCEDURE — 99213 OFFICE O/P EST LOW 20 MIN: CPT

## 2019-06-24 NOTE — DISCUSSION/SUMMARY
[___ Month(s)] : [unfilled] month(s) [Essential Hypertension] : essential hypertension [FreeTextEntry4] : aortic aneurysm [FreeTextEntry1] : prior diagnosis of thoracic aneurysm.  will need CT to follow dimensions for progression.  BP is well controlled off medication. \par \par reviewed risk calculator.  10 year calculator at 7.8%..  discussed options for medication vs dietary and thyroid modification.\par \par pt to contact me also after knee has been re evaluated.\par \par \par 2/2/2018  Patient to undergo knee replacement surgery with Dr. Abiel Haley.  She remains without clinical sx of angina, heart  failure or arrhythmia.  Her prior stress test was normal and she has normal LV function and no evidence of valvular heart disease.  She is optimized from the cardiac perspective for knee surgery without any additional testing.  CT scan demonstrated a 4.0 cm sinus of Valsalva (minimally enlarged)  and normal measurements through the rest of her thoracic aorta.\par \par 6/18/2018  no chest pain. BP is controlled.  reviewed lipid data and 10 year lipid risk is 8.5%,  pt will repeat lipid panel and decide if she will be interested in statin tx.\par \par 6/24/2019  no cardiac sx.  concerned about aortic aneurysm which was measured at 4 cm on earlier CT.   10 year risk calculator with most recent profile is 10%.   she is not in favor of starting a statin at this time. in the absence of sx and well controlled BP we will hold off on repeat echo this year.

## 2019-06-24 NOTE — REASON FOR VISIT
[Follow-Up - Clinic] : a clinic follow-up of [Hyperlipidemia] : hyperlipidemia [Hypertension] : hypertension [Spouse] : spouse

## 2019-06-24 NOTE — PHYSICAL EXAM
[General Appearance - Well Developed] : well developed [Normal Appearance] : normal appearance [Well Groomed] : well groomed [General Appearance - Well Nourished] : well nourished [No Deformities] : no deformities [General Appearance - In No Acute Distress] : no acute distress [Normal Conjunctiva] : the conjunctiva exhibited no abnormalities [Eyelids - No Xanthelasma] : the eyelids demonstrated no xanthelasmas [Normal Oral Mucosa] : normal oral mucosa [No Oral Pallor] : no oral pallor [No Oral Cyanosis] : no oral cyanosis [Normal Jugular Venous V Waves Present] : normal jugular venous V waves present [Normal Jugular Venous A Waves Present] : normal jugular venous A waves present [No Jugular Venous Mireles A Waves] : no jugular venous mireles A waves [Exaggerated Use Of Accessory Muscles For Inspiration] : no accessory muscle use [Respiration, Rhythm And Depth] : normal respiratory rhythm and effort [Auscultation Breath Sounds / Voice Sounds] : lungs were clear to auscultation bilaterally [Normal] : normal [Normal Rate] : normal [Rhythm Regular] : regular [Normal S1] : normal S1 [Normal S2] : normal S2 [No Murmur] : no murmurs heard [Right Carotid Bruit] : no bruit heard over the right carotid [Left Carotid Bruit] : no bruit heard over the left carotid [No Abnormalities] : the abdominal aorta was not enlarged and no bruit was heard [No Pitting Edema] : no pitting edema present [Bruit] : no bruit heard [Lt] : no varicose veins of the left leg [Rt] : no varicose veins of the right leg [Abdomen Tenderness] : non-tender [Abdomen Soft] : soft [Abdomen Mass (___ Cm)] : no abdominal mass palpated [Abnormal Walk] : normal gait [Gait - Sufficient For Exercise Testing] : the gait was sufficient for exercise testing [Nail Clubbing] : no clubbing of the fingernails [Cyanosis, Localized] : no localized cyanosis [Petechial Hemorrhages (___cm)] : no petechial hemorrhages [Skin Color & Pigmentation] : normal skin color and pigmentation [] : no rash [No Venous Stasis] : no venous stasis [Skin Lesions] : no skin lesions [No Skin Ulcers] : no skin ulcer [No Xanthoma] : no  xanthoma was observed [Oriented To Time, Place, And Person] : oriented to person, place, and time [Affect] : the affect was normal [Mood] : the mood was normal [No Anxiety] : not feeling anxious

## 2019-06-24 NOTE — HISTORY OF PRESENT ILLNESS
[FreeTextEntry1] : first visit in over a year.  no cardiac complaints.  taking no new medications.  was concerned about 4.2 cm thoracic aneurysm noted incidentally on prior CT scan at [facility].\par \par blood work provided\par \par Tchol  232\par HDL    74\par LDL    144\par TGC   72\par \par s/p successful left knee replacement.  no new medication issues.\par \par \par 6/24/2019  presents for follow up evaluation of cholesterol and aneurysm.  she took a vitamin for a while but stopped in March.. she takes a number of other supplements.  still has signif knee pain after surgery.  but still works out in the gym.\par

## 2024-08-12 NOTE — ASU DISCHARGE PLAN (ADULT/PEDIATRIC). - WEAR THE DARK SUNGLASSES WE HAVE PROVIDED WHILE OUTDOORS OR IF YOU ARE LIGHT-SENSITIVE
[] : Resident [FreeTextEntry3] : Patient seen and examined. Case discussed with resident. Agree with plan as detailed above.  [Time Spent: ___ minutes] : I have spent [unfilled] minutes of time on the encounter. Statement Selected

## 2024-09-26 NOTE — PATIENT PROFILE ADULT. - PRO SERVICES AT [facility]
Medication: Amlodipine and Metoprolol passed protocol.   Last office visit date: 9/5/2024  Next appointment scheduled?: Yes   Upcoming appointment scheduled on: 3/12/2025   Last refill on: 11/15/2023     
unsure